# Patient Record
Sex: FEMALE | Race: WHITE | NOT HISPANIC OR LATINO | Employment: FULL TIME | URBAN - METROPOLITAN AREA
[De-identification: names, ages, dates, MRNs, and addresses within clinical notes are randomized per-mention and may not be internally consistent; named-entity substitution may affect disease eponyms.]

---

## 2017-03-17 LAB — HM COLONOSCOPY: NORMAL

## 2017-03-27 ENCOUNTER — TRANSCRIBE ORDERS (OUTPATIENT)
Dept: ADMINISTRATIVE | Facility: HOSPITAL | Age: 47
End: 2017-03-27

## 2017-03-27 DIAGNOSIS — Z12.31 VISIT FOR SCREENING MAMMOGRAM: Primary | ICD-10-CM

## 2017-04-18 ENCOUNTER — HOSPITAL ENCOUNTER (OUTPATIENT)
Dept: MAMMOGRAPHY | Facility: HOSPITAL | Age: 47
Discharge: HOME | End: 2017-04-18
Attending: OBSTETRICS & GYNECOLOGY | Admitting: OBSTETRICS & GYNECOLOGY

## 2017-04-18 DIAGNOSIS — R92.1 BREAST CALCIFICATIONS ON MAMMOGRAM: ICD-10-CM

## 2017-04-18 DIAGNOSIS — R92.1 BREAST CALCIFICATIONS ON MAMMOGRAM: Primary | ICD-10-CM

## 2017-04-18 PROCEDURE — G0204 DX MAMMO INCL CAD BI: HCPCS

## 2017-04-18 PROCEDURE — G0279 TOMOSYNTHESIS, MAMMO: HCPCS

## 2017-04-19 ENCOUNTER — TELEPHONE (OUTPATIENT)
Dept: OBSTETRICS AND GYNECOLOGY | Facility: CLINIC | Age: 47
End: 2017-04-19

## 2017-04-19 NOTE — TELEPHONE ENCOUNTER
----- Message from Martinez Hidalgo MD sent at 4/18/2017  4:16 PM EDT -----  Call patient: Normal mammogram

## 2017-06-26 RX ORDER — IRON POLYSACCHARIDE COMPLEX 150 MG
150 CAPSULE ORAL
COMMUNITY
Start: 2017-04-14 | End: 2017-07-11

## 2017-06-26 RX ORDER — HYDROCHLOROTHIAZIDE 12.5 MG/1
12.5 TABLET ORAL DAILY
COMMUNITY
Start: 2016-12-16 | End: 2021-06-11

## 2017-06-26 RX ORDER — ERGOCALCIFEROL 1.25 MG/1
50000 CAPSULE ORAL
COMMUNITY
End: 2017-07-11

## 2017-06-28 ENCOUNTER — OFFICE VISIT (OUTPATIENT)
Dept: SURGERY | Facility: CLINIC | Age: 47
End: 2017-06-28

## 2017-06-28 VITALS
SYSTOLIC BLOOD PRESSURE: 134 MMHG | BODY MASS INDEX: 25.73 KG/M2 | DIASTOLIC BLOOD PRESSURE: 84 MMHG | HEART RATE: 99 BPM | OXYGEN SATURATION: 98 % | WEIGHT: 136.2 LBS | TEMPERATURE: 98.6 F

## 2017-06-28 DIAGNOSIS — K62.5 RECTAL BLEEDING: ICD-10-CM

## 2017-06-28 DIAGNOSIS — D64.9 ANEMIA, UNSPECIFIED TYPE: ICD-10-CM

## 2017-06-28 DIAGNOSIS — Z87.19 HISTORY OF CROHN'S DISEASE: ICD-10-CM

## 2017-06-28 DIAGNOSIS — K62.1 RECTAL POLYP: Primary | ICD-10-CM

## 2017-06-28 PROCEDURE — 46600 DIAGNOSTIC ANOSCOPY SPX: CPT | Performed by: COLON & RECTAL SURGERY

## 2017-06-28 PROCEDURE — 99244 OFF/OP CNSLTJ NEW/EST MOD 40: CPT | Performed by: COLON & RECTAL SURGERY

## 2017-06-28 RX ORDER — SODIUM CHLORIDE 0.9 % (FLUSH) 0.9 %
1-10 SYRINGE (ML) INJECTION AS NEEDED
Status: CANCELLED | OUTPATIENT
Start: 2017-06-28

## 2017-06-28 RX ORDER — CEFAZOLIN SODIUM 2 G/100ML
2 INJECTION, SOLUTION INTRAVENOUS ONCE
Status: CANCELLED | OUTPATIENT
Start: 2017-06-28 | End: 2017-06-28

## 2017-06-28 RX ORDER — HYDROCORTISONE ACETATE 25 MG
SUPPOSITORY, RECTAL RECTAL
COMMUNITY
Start: 2017-05-30 | End: 2017-07-11

## 2017-06-28 NOTE — PROGRESS NOTES
"Theresa Capone is a 46 y.o. female who is seen as a consult at the request of Dr. Martinez Hidalgo for Rectal Bleeding.      HPI:    Pt with Crohn's diagnosed 1992 c/o rectal bleeding x several months    Was on Humira <1 year [id number]  Not currently on an meds for Crohn's  Dr. Murcia had considered entyvio    History resection TI/cecum 1993    2004: pt states she had a \"leakage in colon cauterized\"    Hx perianal fistulae    Most recent colonoscopy Dr. Murcia 3/17/2017    Bright red blood per rectum has worsened since then    Sees blood on toilet paper daily    Has 2 BMs per day    OB/GYN:  +hx irreg Pap  + ?colposcopy    Past Medical History:   Diagnosis Date   • Anemia     IRON DEFICIENCY   • Anemia     VITAMIN B 12 ANEMIA   • ASCUS with positive high risk HPV cervical 2014   • Crohn's disease 1991   • Crohn's ileitis 1992   • Fibrocystic breast    • Fistula of small intestine 1990   • H/O intestinal malabsorption     D/T CROHNS   • Hair loss    • History of blood transfusion     MULTIPLE   • Hypertension    • Immunosuppression     D/T MEDS FOR CROHNS   • Infertility, female    • LGSIL (low grade squamous intraepithelial dysplasia) 2015   • Perianal fistula     MULTIPLE   • Vitamin B 12 deficiency 07/2016   • Vitamin D deficiency        Past Surgical History:   Procedure Laterality Date   • APPENDECTOMY N/A    • BREAST BIOPSY Right 04/17/2015    BENIGN-DR.HEATH HIDALGO   • BREAST BIOPSY Right 12/14/2010    BENIGN- DR. BERNADETTE MUSE   • COLON RESECTION  1991   • COLONOSCOPY N/A 2010    WNL   • COLONOSCOPY N/A 03/07/2014    CIRCUMFERENTIAL ULCERATION AT TI, FB STAPLE REMOVED AND AREA COAGULATED,    • COLPOSCOPY W/ BIOPSY / CURETTAGE  06/12/2015    [facility]   • D&C HYSTEROSCOPY ENDOMETRIAL ABLATION N/A 11/15/2012    [facility]IA / DR. HIDALGO   • ENDOSCOPY AND COLONOSCOPY N/A 03/17/2017    ACTIVE ULCERATION AT THE NEOTERMINAL ILEUM, HYPERTROPHIC ANAL PAPILLA,    • ENDOSCOPY AND COLONOSCOPY N/A 10/2012    CLS WITH " ILEOCOLONIC ANASTAMOSIS EROSION, EGD OK, NEGATIVE SB BX,    • SMALL INTESTINE SURGERY N/A 1990    PERFORATION       Social History:   reports that she has never smoked. She has never used smokeless tobacco. She reports that she does not drink alcohol or use illicit drugs.      Marriage status:     Family History   Problem Relation Age of Onset   • Hypertension Mother    • Hypertension Father    • Stroke Maternal Aunt    • Hypertension Brother          Current Outpatient Prescriptions:   •  BIOTIN 5000 PO, Take 5,000 mg by mouth., Disp: , Rfl:   •  MAGNESIUM-POTASSIUM PO, Take  by mouth., Disp: , Rfl:   •  ANUCORT-HC 25 MG suppository, , Disp: , Rfl:   •  hydrochlorothiazide (HYDRODIURIL) 12.5 MG tablet, Take 12.5 mg by mouth., Disp: , Rfl:   •  hydrocortisone (ANUSOL-HC) 2.5 % rectal cream, Insert  into the rectum., Disp: , Rfl:   •  iron polysaccharides (NIFEREX) 150 MG capsule, Take 150 mg by mouth., Disp: , Rfl:   •  sodium phosphates (VISICOL) 1.102-0.398 G tablet tablet, Take 32 tablets by mouth., Disp: , Rfl:   •  VITAMIN B1-B12 IM, Inject  into the shoulder, thigh, or buttocks., Disp: , Rfl:   •  vitamin D (ERGOCALCIFEROL) 73881 UNITS capsule capsule, Take 50,000 Units by mouth., Disp: , Rfl:     Allergy  Review of patient's allergies indicates no known allergies.    Review of Systems   Constitution: Negative for decreased appetite, weakness and weight gain.   HENT: Negative for congestion, headaches, hearing loss and hoarse voice.    Eyes: Negative for blurred vision, discharge and visual disturbance.   Cardiovascular: Negative for chest pain, cyanosis and leg swelling.   Respiratory: Negative for cough, shortness of breath, sleep disturbances due to breathing and snoring.    Endocrine: Negative for cold intolerance and heat intolerance.   Hematologic/Lymphatic: Does not bruise/bleed easily.   Skin: Negative for itching, poor wound healing and skin cancer.   Musculoskeletal: Negative for  arthritis, back pain, joint pain and joint swelling.   Gastrointestinal: Negative for abdominal pain, change in bowel habit, bowel incontinence and constipation.   Genitourinary: Negative for bladder incontinence, dysuria and hematuria.   Neurological: Negative for brief paralysis, excessive daytime sleepiness, dizziness, focal weakness and light-headedness.   Psychiatric/Behavioral: Negative for altered mental status and hallucinations. The patient does not have insomnia.    Allergic/Immunologic: Negative for HIV exposure and persistent infections.   All other systems reviewed and are negative.      Vitals:    06/28/17 1545   BP: 134/84   Pulse: 99   Temp: 98.6 °F (37 °C)   SpO2: 98%     Body mass index is 25.73 kg/(m^2).    Physical Exam   Constitutional: She is oriented to person, place, and time. She appears well-developed and well-nourished. No distress.   HENT:   Head: Normocephalic and atraumatic.   Nose: Nose normal.   Mouth/Throat: Oropharynx is clear and moist.   Eyes: Conjunctivae and EOM are normal. Pupils are equal, round, and reactive to light.   Neck: Normal range of motion. No tracheal deviation present.   Pulmonary/Chest: Effort normal and breath sounds normal. No respiratory distress.   Abdominal: Soft. Bowel sounds are normal. She exhibits no distension.   Genitourinary:   Genitourinary Comments: Perianal exam: external hem - wnl  MAMADOU- L anal canal polyp  Anoscopy performed:  L inflamed polypoid lesion 2.5 cm long   Musculoskeletal: Normal range of motion. She exhibits no edema or deformity.   Neurological: She is alert and oriented to person, place, and time. No cranial nerve deficit. Coordination and gait normal.   Skin: Skin is warm and dry.   Psychiatric: She has a normal mood and affect. Her behavior is normal. Judgment and thought content normal.       Review of Medical Record:  I reviewed Dr. Murcia notes, Dr. Frazier notes    Assessment:  1. Rectal polyp    2. Rectal bleeding    3. History  of Crohn's disease    4. Anemia, unspecified type        Plan:    I recommend transanal excision of rectal polyp.  I discussed risk including bleeding, infection, injury to sphincters; benefits; and alternatives. Also discussed increased risk of poor wound healing due to Crohn's.  I discussed in detail expected recovery, possible urinary retention, time off activities, and healing.  Patient expresses understanding and wishes to proceed.    After excision, I discussed with pt and  that pt needs to f/u with Dr. Murcia re: getting on meds for Crohns    Scribed for Agustina Melendrez MD by Rut Johns PA-C 6/28/2017    This patient was evaluated by me, recommendations made, documentation reviewed, edited, and revised by me, Agustina Melendrez MD      EMR Dragon/Transcription disclaimer:   Much of this encounter note is an electronic transcription/translation of spoken language to printed text. The electronic translation of spoken language may permit erroneous, or at times, nonsensical words or phrases to be inadvertently transcribed; Although I have reviewed the note for such errors, some may still exist.

## 2017-07-11 ENCOUNTER — APPOINTMENT (OUTPATIENT)
Dept: PREADMISSION TESTING | Facility: HOSPITAL | Age: 47
End: 2017-07-11

## 2017-07-11 VITALS
SYSTOLIC BLOOD PRESSURE: 114 MMHG | RESPIRATION RATE: 18 BRPM | HEART RATE: 67 BPM | OXYGEN SATURATION: 99 % | HEIGHT: 61 IN | BODY MASS INDEX: 25.85 KG/M2 | WEIGHT: 136.9 LBS | DIASTOLIC BLOOD PRESSURE: 77 MMHG | TEMPERATURE: 97.8 F

## 2017-07-11 LAB
ANION GAP SERPL CALCULATED.3IONS-SCNC: 10.5 MMOL/L
BUN BLD-MCNC: 18 MG/DL (ref 6–20)
BUN/CREAT SERPL: 20.2 (ref 7–25)
CALCIUM SPEC-SCNC: 9.6 MG/DL (ref 8.6–10.5)
CHLORIDE SERPL-SCNC: 98 MMOL/L (ref 98–107)
CO2 SERPL-SCNC: 31.5 MMOL/L (ref 22–29)
CREAT BLD-MCNC: 0.89 MG/DL (ref 0.57–1)
DEPRECATED RDW RBC AUTO: 47.7 FL (ref 37–54)
ERYTHROCYTE [DISTWIDTH] IN BLOOD BY AUTOMATED COUNT: 14.1 % (ref 11.7–13)
GFR SERPL CREATININE-BSD FRML MDRD: 68 ML/MIN/1.73
GLUCOSE BLD-MCNC: 115 MG/DL (ref 65–99)
HCG SERPL QL: NEGATIVE
HCT VFR BLD AUTO: 38.4 % (ref 35.6–45.5)
HGB BLD-MCNC: 13.3 G/DL (ref 11.9–15.5)
MCH RBC QN AUTO: 32.3 PG (ref 26.9–32)
MCHC RBC AUTO-ENTMCNC: 34.6 G/DL (ref 32.4–36.3)
MCV RBC AUTO: 93.2 FL (ref 80.5–98.2)
PLATELET # BLD AUTO: 231 10*3/MM3 (ref 140–500)
PMV BLD AUTO: 11.4 FL (ref 6–12)
POTASSIUM BLD-SCNC: 3.7 MMOL/L (ref 3.5–5.2)
RBC # BLD AUTO: 4.12 10*6/MM3 (ref 3.9–5.2)
SODIUM BLD-SCNC: 140 MMOL/L (ref 136–145)
WBC NRBC COR # BLD: 6.17 10*3/MM3 (ref 4.5–10.7)

## 2017-07-11 PROCEDURE — 36415 COLL VENOUS BLD VENIPUNCTURE: CPT

## 2017-07-11 PROCEDURE — 84703 CHORIONIC GONADOTROPIN ASSAY: CPT | Performed by: COLON & RECTAL SURGERY

## 2017-07-11 PROCEDURE — 80048 BASIC METABOLIC PNL TOTAL CA: CPT | Performed by: COLON & RECTAL SURGERY

## 2017-07-11 PROCEDURE — 85027 COMPLETE CBC AUTOMATED: CPT | Performed by: COLON & RECTAL SURGERY

## 2017-07-11 NOTE — DISCHARGE INSTRUCTIONS
Take the following medications the morning of surgery with a small sip of water:  NONE    Arrive to hospital on your day of surgery at 5:30 AM.    General Instructions:  • Do not eat solid food after midnight the night before surgery.  • You may drink clear liquids day of surgery but must stop at least one hour before your hospital arrival time 4:30 AM.  • It is beneficial for you to have a clear drink that contains carbohydrates the day of surgery.  We suggest a 20 ounce bottle of Gatorade or Powerade for non-diabetic patients or a 20 ounce bottle of G2 or Powerade Zero for diabetic patients. (Pediatric patients, are not advised to drink a 20 ounce carbohydrate drink)    Clear liquids are liquids you can see through.  Nothing red in color.     Plain water                               Sports drinks  Sodas                                   Gelatin (Jell-O)  Fruit juices without pulp such as white grape juice and apple juice  Popsicles that contain no fruit or yogurt  Tea or coffee (no cream or milk added)  Gatorade / Powerade  G2 / Powerade Zero    • Infants may have breast milk up to four hours before surgery.  • Infants drinking formula may drink formula up to six hours before surgery.   • Patients who avoid smoking, chewing tobacco and alcohol for 4 weeks prior to surgery have a reduced risk of post-operative complications.  Quit smoking as many days before surgery as you can.  • Do not smoke, use chewing tobacco or drink alcohol the day of surgery.   • If applicable bring your C-PAP/ BI-PAP machine.  • Bring any papers given to you in the doctor’s office.  • Wear clean comfortable clothes and socks.  • Do not wear contact lenses or make-up.  Bring a case for your glasses.   • Bring crutches or walker if applicable.  • Leave all other valuables and jewelry at home.  • The Pre-Admission Testing nurse will instruct you to bring medications if unable to obtain an accurate list in Pre-Admission Testing.        If you  were given a blood bank ID arm band remember to bring it with you the day of surgery.    Preventing a Surgical Site Infection:  • For 2 to 3 days before surgery, avoid shaving with a razor because the razor can irritate skin and make it easier to develop an infection.  • The night prior to surgery sleep in a clean bed with clean clothing.  Do not allow pets to sleep with you.  • Shower on the morning of surgery using a fresh bar of anti-bacterial soap (such as Dial) and clean washcloth.  Dry with a clean towel and dress in clean clothing.  • Ask your surgeon if you will be receiving antibiotics prior to surgery.  • Make sure you, your family, and all healthcare providers clean their hands with soap and water or an alcohol based hand  before caring for you or your wound.    Day of surgery:  Upon arrival, a Pre-op nurse and Anesthesiologist will review your health history, obtain vital signs, and answer questions you may have.  The only belongings needed at this time will be your home medications and if applicable your C-PAP/BI-PAP machine.  If you are staying overnight your family can leave the rest of your belongings in the car and bring them to your room later.  A Pre-op nurse will start an IV and you may receive medication in preparation for surgery, including something to help you relax.  Your family will be able to see you in the Pre-op area.  While you are in surgery your family should notify the waiting room  if they leave the waiting room area and provide a contact phone number.    Please be aware that surgery does come with discomfort.  We want to make every effort to control your discomfort so please discuss any uncontrolled symptoms with your nurse.   Your doctor will most likely have prescribed pain medications.      If you are going home after surgery you will receive individualized written care instructions before being discharged.  A responsible adult must drive you to and from the  hospital on the day of your surgery and stay with you for 24 hours.    If you are staying overnight following surgery, you will be transported to your hospital room following the recovery period.  [facility] has all private rooms.    If you have any questions please call Pre-Admission Testing at [phone].  Deductibles and co-payments are collected on the day of service. Please be prepared to pay the required co-pay, deductible or deposit on the day of service as defined by your plan.

## 2017-07-17 ENCOUNTER — HOSPITAL ENCOUNTER (OUTPATIENT)
Facility: HOSPITAL | Age: 47
Setting detail: HOSPITAL OUTPATIENT SURGERY
Discharge: HOME | End: 2017-07-17
Attending: COLON & RECTAL SURGERY | Admitting: COLON & RECTAL SURGERY

## 2017-07-17 ENCOUNTER — ANESTHESIA (OUTPATIENT)
Dept: PERIOP | Facility: HOSPITAL | Age: 47
End: 2017-07-17

## 2017-07-17 ENCOUNTER — ANESTHESIA EVENT (OUTPATIENT)
Dept: PERIOP | Facility: HOSPITAL | Age: 47
End: 2017-07-17

## 2017-07-17 VITALS
RESPIRATION RATE: 18 BRPM | DIASTOLIC BLOOD PRESSURE: 75 MMHG | OXYGEN SATURATION: 98 % | HEART RATE: 59 BPM | HEIGHT: 61 IN | WEIGHT: 136 LBS | TEMPERATURE: 97.6 F | BODY MASS INDEX: 25.68 KG/M2 | SYSTOLIC BLOOD PRESSURE: 117 MMHG

## 2017-07-17 DIAGNOSIS — K62.1 RECTAL POLYP: ICD-10-CM

## 2017-07-17 PROCEDURE — 25010000003 CEFAZOLIN IN DEXTROSE 2-4 GM/100ML-% SOLUTION: Performed by: PHYSICIAN ASSISTANT

## 2017-07-17 PROCEDURE — 25010000002 PROPOFOL 10 MG/ML EMULSION: Performed by: NURSE ANESTHETIST, CERTIFIED REGISTERED

## 2017-07-17 PROCEDURE — 45171 EXC RECT TUM TRANSANAL PART: CPT | Performed by: COLON & RECTAL SURGERY

## 2017-07-17 PROCEDURE — 88307 TISSUE EXAM BY PATHOLOGIST: CPT | Performed by: COLON & RECTAL SURGERY

## 2017-07-17 PROCEDURE — 25010000002 MIDAZOLAM PER 1 MG: Performed by: ANESTHESIOLOGY

## 2017-07-17 PROCEDURE — 25010000002 ONDANSETRON PER 1 MG: Performed by: NURSE ANESTHETIST, CERTIFIED REGISTERED

## 2017-07-17 PROCEDURE — 25010000002 NEOSTIGMINE PER 0.5 MG: Performed by: NURSE ANESTHETIST, CERTIFIED REGISTERED

## 2017-07-17 PROCEDURE — 25010000002 DEXAMETHASONE PER 1 MG: Performed by: NURSE ANESTHETIST, CERTIFIED REGISTERED

## 2017-07-17 PROCEDURE — 25010000002 FENTANYL CITRATE (PF) 100 MCG/2ML SOLUTION: Performed by: NURSE ANESTHETIST, CERTIFIED REGISTERED

## 2017-07-17 PROCEDURE — 25010000002 MEPERIDINE 25 MG/0.5ML SOLUTION

## 2017-07-17 RX ORDER — DIPHENHYDRAMINE HYDROCHLORIDE 50 MG/ML
12.5 INJECTION INTRAMUSCULAR; INTRAVENOUS
Status: DISCONTINUED | OUTPATIENT
Start: 2017-07-17 | End: 2017-07-17 | Stop reason: HOSPADM

## 2017-07-17 RX ORDER — LIDOCAINE 50 MG/G
OINTMENT TOPICAL EVERY 4 HOURS PRN
Qty: 1 TUBE | Refills: 5 | Status: SHIPPED | OUTPATIENT
Start: 2017-07-17 | End: 2017-08-16

## 2017-07-17 RX ORDER — MAGNESIUM HYDROXIDE 1200 MG/15ML
LIQUID ORAL AS NEEDED
Status: DISCONTINUED | OUTPATIENT
Start: 2017-07-17 | End: 2017-07-17 | Stop reason: HOSPADM

## 2017-07-17 RX ORDER — GLYCOPYRROLATE 0.2 MG/ML
INJECTION INTRAMUSCULAR; INTRAVENOUS AS NEEDED
Status: DISCONTINUED | OUTPATIENT
Start: 2017-07-17 | End: 2017-07-17 | Stop reason: SURG

## 2017-07-17 RX ORDER — OXYCODONE HYDROCHLORIDE AND ACETAMINOPHEN 5; 325 MG/1; MG/1
2 TABLET ORAL ONCE AS NEEDED
Status: DISCONTINUED | OUTPATIENT
Start: 2017-07-17 | End: 2017-07-17 | Stop reason: HOSPADM

## 2017-07-17 RX ORDER — PROMETHAZINE HYDROCHLORIDE 25 MG/ML
12.5 INJECTION, SOLUTION INTRAMUSCULAR; INTRAVENOUS ONCE AS NEEDED
Status: DISCONTINUED | OUTPATIENT
Start: 2017-07-17 | End: 2017-07-17 | Stop reason: HOSPADM

## 2017-07-17 RX ORDER — MIDAZOLAM HYDROCHLORIDE 1 MG/ML
2 INJECTION INTRAMUSCULAR; INTRAVENOUS
Status: DISCONTINUED | OUTPATIENT
Start: 2017-07-17 | End: 2017-07-17 | Stop reason: HOSPADM

## 2017-07-17 RX ORDER — CETIRIZINE HYDROCHLORIDE 10 MG/1
10 TABLET ORAL DAILY
COMMUNITY
End: 2017-09-13

## 2017-07-17 RX ORDER — PROMETHAZINE HYDROCHLORIDE 12.5 MG/1
TABLET ORAL
Qty: 46 TABLET | Refills: 0 | Status: SHIPPED | OUTPATIENT
Start: 2017-07-17 | End: 2017-09-13

## 2017-07-17 RX ORDER — ONDANSETRON 4 MG/1
4 TABLET, FILM COATED ORAL ONCE AS NEEDED
Status: DISCONTINUED | OUTPATIENT
Start: 2017-07-17 | End: 2017-07-17 | Stop reason: HOSPADM

## 2017-07-17 RX ORDER — MIDAZOLAM HYDROCHLORIDE 1 MG/ML
1 INJECTION INTRAMUSCULAR; INTRAVENOUS
Status: DISCONTINUED | OUTPATIENT
Start: 2017-07-17 | End: 2017-07-17 | Stop reason: HOSPADM

## 2017-07-17 RX ORDER — NALOXONE HCL 0.4 MG/ML
0.2 VIAL (ML) INJECTION AS NEEDED
Status: DISCONTINUED | OUTPATIENT
Start: 2017-07-17 | End: 2017-07-17 | Stop reason: HOSPADM

## 2017-07-17 RX ORDER — ONDANSETRON 2 MG/ML
INJECTION INTRAMUSCULAR; INTRAVENOUS AS NEEDED
Status: DISCONTINUED | OUTPATIENT
Start: 2017-07-17 | End: 2017-07-17 | Stop reason: SURG

## 2017-07-17 RX ORDER — PROMETHAZINE HYDROCHLORIDE 25 MG/1
25 TABLET ORAL ONCE AS NEEDED
Status: DISCONTINUED | OUTPATIENT
Start: 2017-07-17 | End: 2017-07-17 | Stop reason: HOSPADM

## 2017-07-17 RX ORDER — FENTANYL CITRATE 50 UG/ML
50 INJECTION, SOLUTION INTRAMUSCULAR; INTRAVENOUS
Status: DISCONTINUED | OUTPATIENT
Start: 2017-07-17 | End: 2017-07-17 | Stop reason: HOSPADM

## 2017-07-17 RX ORDER — OXYCODONE HYDROCHLORIDE AND ACETAMINOPHEN 5; 325 MG/1; MG/1
TABLET ORAL
Qty: 64 TABLET | Refills: 0 | Status: SHIPPED | OUTPATIENT
Start: 2017-07-17 | End: 2017-07-25

## 2017-07-17 RX ORDER — EPHEDRINE SULFATE 50 MG/ML
5 INJECTION, SOLUTION INTRAVENOUS ONCE AS NEEDED
Status: DISCONTINUED | OUTPATIENT
Start: 2017-07-17 | End: 2017-07-17 | Stop reason: HOSPADM

## 2017-07-17 RX ORDER — LIDOCAINE HYDROCHLORIDE 40 MG/ML
SOLUTION TOPICAL AS NEEDED
Status: DISCONTINUED | OUTPATIENT
Start: 2017-07-17 | End: 2017-07-17 | Stop reason: SURG

## 2017-07-17 RX ORDER — LABETALOL HYDROCHLORIDE 5 MG/ML
5 INJECTION, SOLUTION INTRAVENOUS
Status: DISCONTINUED | OUTPATIENT
Start: 2017-07-17 | End: 2017-07-17 | Stop reason: HOSPADM

## 2017-07-17 RX ORDER — HYDRALAZINE HYDROCHLORIDE 20 MG/ML
5 INJECTION INTRAMUSCULAR; INTRAVENOUS
Status: DISCONTINUED | OUTPATIENT
Start: 2017-07-17 | End: 2017-07-17 | Stop reason: HOSPADM

## 2017-07-17 RX ORDER — PROPOFOL 10 MG/ML
VIAL (ML) INTRAVENOUS AS NEEDED
Status: DISCONTINUED | OUTPATIENT
Start: 2017-07-17 | End: 2017-07-17 | Stop reason: SURG

## 2017-07-17 RX ORDER — PROMETHAZINE HYDROCHLORIDE 25 MG/1
25 SUPPOSITORY RECTAL ONCE AS NEEDED
Status: DISCONTINUED | OUTPATIENT
Start: 2017-07-17 | End: 2017-07-17 | Stop reason: HOSPADM

## 2017-07-17 RX ORDER — HYDROCODONE BITARTRATE AND ACETAMINOPHEN 7.5; 325 MG/1; MG/1
1 TABLET ORAL ONCE AS NEEDED
Status: DISCONTINUED | OUTPATIENT
Start: 2017-07-17 | End: 2017-07-17 | Stop reason: HOSPADM

## 2017-07-17 RX ORDER — SODIUM CHLORIDE, SODIUM LACTATE, POTASSIUM CHLORIDE, CALCIUM CHLORIDE 600; 310; 30; 20 MG/100ML; MG/100ML; MG/100ML; MG/100ML
9 INJECTION, SOLUTION INTRAVENOUS CONTINUOUS
Status: DISCONTINUED | OUTPATIENT
Start: 2017-07-17 | End: 2017-07-17 | Stop reason: HOSPADM

## 2017-07-17 RX ORDER — PROMETHAZINE HYDROCHLORIDE 25 MG/1
12.5 TABLET ORAL ONCE AS NEEDED
Status: DISCONTINUED | OUTPATIENT
Start: 2017-07-17 | End: 2017-07-17 | Stop reason: HOSPADM

## 2017-07-17 RX ORDER — HYDROMORPHONE HYDROCHLORIDE 1 MG/ML
0.5 INJECTION, SOLUTION INTRAMUSCULAR; INTRAVENOUS; SUBCUTANEOUS
Status: DISCONTINUED | OUTPATIENT
Start: 2017-07-17 | End: 2017-07-17 | Stop reason: HOSPADM

## 2017-07-17 RX ORDER — ONDANSETRON 2 MG/ML
4 INJECTION INTRAMUSCULAR; INTRAVENOUS ONCE AS NEEDED
Status: DISCONTINUED | OUTPATIENT
Start: 2017-07-17 | End: 2017-07-17 | Stop reason: HOSPADM

## 2017-07-17 RX ORDER — FLUMAZENIL 0.1 MG/ML
0.2 INJECTION INTRAVENOUS AS NEEDED
Status: DISCONTINUED | OUTPATIENT
Start: 2017-07-17 | End: 2017-07-17 | Stop reason: HOSPADM

## 2017-07-17 RX ORDER — LIDOCAINE HYDROCHLORIDE 20 MG/ML
INJECTION, SOLUTION INFILTRATION; PERINEURAL AS NEEDED
Status: DISCONTINUED | OUTPATIENT
Start: 2017-07-17 | End: 2017-07-17 | Stop reason: SURG

## 2017-07-17 RX ORDER — FAMOTIDINE 10 MG/ML
20 INJECTION, SOLUTION INTRAVENOUS ONCE
Status: COMPLETED | OUTPATIENT
Start: 2017-07-17 | End: 2017-07-17

## 2017-07-17 RX ORDER — SODIUM CHLORIDE 0.9 % (FLUSH) 0.9 %
1-10 SYRINGE (ML) INJECTION AS NEEDED
Status: DISCONTINUED | OUTPATIENT
Start: 2017-07-17 | End: 2017-07-17 | Stop reason: HOSPADM

## 2017-07-17 RX ORDER — FENTANYL CITRATE 50 UG/ML
INJECTION, SOLUTION INTRAMUSCULAR; INTRAVENOUS AS NEEDED
Status: DISCONTINUED | OUTPATIENT
Start: 2017-07-17 | End: 2017-07-17 | Stop reason: SURG

## 2017-07-17 RX ORDER — POLYETHYLENE GLYCOL 3350 17 G/17G
17 POWDER, FOR SOLUTION ORAL DAILY
Start: 2017-07-17 | End: 2017-09-13

## 2017-07-17 RX ORDER — ROCURONIUM BROMIDE 10 MG/ML
INJECTION, SOLUTION INTRAVENOUS AS NEEDED
Status: DISCONTINUED | OUTPATIENT
Start: 2017-07-17 | End: 2017-07-17 | Stop reason: SURG

## 2017-07-17 RX ORDER — CEFAZOLIN SODIUM 2 G/100ML
2 INJECTION, SOLUTION INTRAVENOUS ONCE
Status: COMPLETED | OUTPATIENT
Start: 2017-07-17 | End: 2017-07-17

## 2017-07-17 RX ORDER — DEXAMETHASONE SODIUM PHOSPHATE 10 MG/ML
INJECTION INTRAMUSCULAR; INTRAVENOUS AS NEEDED
Status: DISCONTINUED | OUTPATIENT
Start: 2017-07-17 | End: 2017-07-17 | Stop reason: SURG

## 2017-07-17 RX ORDER — OXYCODONE AND ACETAMINOPHEN 7.5; 325 MG/1; MG/1
1 TABLET ORAL ONCE AS NEEDED
Status: DISCONTINUED | OUTPATIENT
Start: 2017-07-17 | End: 2017-07-17 | Stop reason: HOSPADM

## 2017-07-17 RX ADMIN — DEXAMETHASONE SODIUM PHOSPHATE 8 MG: 10 INJECTION INTRAMUSCULAR; INTRAVENOUS at 07:44

## 2017-07-17 RX ADMIN — SODIUM CHLORIDE, POTASSIUM CHLORIDE, SODIUM LACTATE AND CALCIUM CHLORIDE: 600; 310; 30; 20 INJECTION, SOLUTION INTRAVENOUS at 08:13

## 2017-07-17 RX ADMIN — LIDOCAINE HYDROCHLORIDE 1 EACH: 40 SPRAY LARYNGEAL; TRANSTRACHEAL at 07:42

## 2017-07-17 RX ADMIN — LIDOCAINE HYDROCHLORIDE 60 MG: 20 INJECTION, SOLUTION INFILTRATION; PERINEURAL at 07:35

## 2017-07-17 RX ADMIN — PROPOFOL 150 MG: 10 INJECTION, EMULSION INTRAVENOUS at 07:35

## 2017-07-17 RX ADMIN — ROCURONIUM BROMIDE 25 MG: 10 INJECTION INTRAVENOUS at 07:35

## 2017-07-17 RX ADMIN — NEOSTIGMINE METHYLSULFATE 3 MG: 1 INJECTION INTRAMUSCULAR; INTRAVENOUS; SUBCUTANEOUS at 08:07

## 2017-07-17 RX ADMIN — CEFAZOLIN SODIUM 2 G: 2 INJECTION, SOLUTION INTRAVENOUS at 07:43

## 2017-07-17 RX ADMIN — GLYCOPYRROLATE 0.4 MG: 0.2 INJECTION INTRAMUSCULAR; INTRAVENOUS at 08:07

## 2017-07-17 RX ADMIN — SODIUM CHLORIDE, POTASSIUM CHLORIDE, SODIUM LACTATE AND CALCIUM CHLORIDE 9 ML/HR: 600; 310; 30; 20 INJECTION, SOLUTION INTRAVENOUS at 07:04

## 2017-07-17 RX ADMIN — ONDANSETRON 4 MG: 2 INJECTION INTRAMUSCULAR; INTRAVENOUS at 08:05

## 2017-07-17 RX ADMIN — FENTANYL CITRATE 50 MCG: 50 INJECTION INTRAMUSCULAR; INTRAVENOUS at 07:31

## 2017-07-17 RX ADMIN — METRONIDAZOLE 500 MG: 500 INJECTION, SOLUTION INTRAVENOUS at 06:43

## 2017-07-17 RX ADMIN — MIDAZOLAM 2 MG: 1 INJECTION INTRAMUSCULAR; INTRAVENOUS at 07:04

## 2017-07-17 RX ADMIN — FAMOTIDINE 20 MG: 10 INJECTION INTRAVENOUS at 07:04

## 2017-07-17 RX ADMIN — MEPERIDINE HYDROCHLORIDE 12.5 MG: 25 INJECTION, SOLUTION INTRAMUSCULAR; INTRAVENOUS; SUBCUTANEOUS at 08:29

## 2017-07-17 NOTE — H&P (VIEW-ONLY)
"Theresa Capone is a 46 y.o. female who is seen as a consult at the request of Dr. Martinez Hidalgo for Rectal Bleeding.      HPI:    Pt with Crohn's diagnosed 1992 c/o rectal bleeding x several months    Was on Humira <1 year [id number]  Not currently on an meds for Crohn's  Dr. Murcia had considered entyvio    History resection TI/cecum 1993    2004: pt states she had a \"leakage in colon cauterized\"    Hx perianal fistulae    Most recent colonoscopy Dr. Murcia 3/17/2017    Bright red blood per rectum has worsened since then    Sees blood on toilet paper daily    Has 2 BMs per day    OB/GYN:  +hx irreg Pap  + ?colposcopy    Past Medical History:   Diagnosis Date   • Anemia     IRON DEFICIENCY   • Anemia     VITAMIN B 12 ANEMIA   • ASCUS with positive high risk HPV cervical 2014   • Crohn's disease 1991   • Crohn's ileitis 1992   • Fibrocystic breast    • Fistula of small intestine 1990   • H/O intestinal malabsorption     D/T CROHNS   • Hair loss    • History of blood transfusion     MULTIPLE   • Hypertension    • Immunosuppression     D/T MEDS FOR CROHNS   • Infertility, female    • LGSIL (low grade squamous intraepithelial dysplasia) 2015   • Perianal fistula     MULTIPLE   • Vitamin B 12 deficiency 07/2016   • Vitamin D deficiency        Past Surgical History:   Procedure Laterality Date   • APPENDECTOMY N/A    • BREAST BIOPSY Right 04/17/2015    BENIGN-DR.HEATH HIDALGO   • BREAST BIOPSY Right 12/14/2010    BENIGN- DR. BERNADETTE MUSE   • COLON RESECTION  1991   • COLONOSCOPY N/A 2010    WNL   • COLONOSCOPY N/A 03/07/2014    CIRCUMFERENTIAL ULCERATION AT TI, FB STAPLE REMOVED AND AREA COAGULATED,    • COLPOSCOPY W/ BIOPSY / CURETTAGE  06/12/2015    [facility]   • D&C HYSTEROSCOPY ENDOMETRIAL ABLATION N/A 11/15/2012    YECENIA / DR. HIDALGO   • ENDOSCOPY AND COLONOSCOPY N/A 03/17/2017    ACTIVE ULCERATION AT THE NEOTERMINAL ILEUM, HYPERTROPHIC ANAL PAPILLA,    • ENDOSCOPY AND COLONOSCOPY N/A 10/2012    CLS WITH " ILEOCOLONIC ANASTAMOSIS EROSION, EGD OK, NEGATIVE SB BX,    • SMALL INTESTINE SURGERY N/A 1990    PERFORATION       Social History:   reports that she has never smoked. She has never used smokeless tobacco. She reports that she does not drink alcohol or use illicit drugs.      Marriage status:     Family History   Problem Relation Age of Onset   • Hypertension Mother    • Hypertension Father    • Stroke Maternal Aunt    • Hypertension Brother          Current Outpatient Prescriptions:   •  BIOTIN 5000 PO, Take 5,000 mg by mouth., Disp: , Rfl:   •  MAGNESIUM-POTASSIUM PO, Take  by mouth., Disp: , Rfl:   •  ANUCORT-HC 25 MG suppository, , Disp: , Rfl:   •  hydrochlorothiazide (HYDRODIURIL) 12.5 MG tablet, Take 12.5 mg by mouth., Disp: , Rfl:   •  hydrocortisone (ANUSOL-HC) 2.5 % rectal cream, Insert  into the rectum., Disp: , Rfl:   •  iron polysaccharides (NIFEREX) 150 MG capsule, Take 150 mg by mouth., Disp: , Rfl:   •  sodium phosphates (VISICOL) 1.102-0.398 G tablet tablet, Take 32 tablets by mouth., Disp: , Rfl:   •  VITAMIN B1-B12 IM, Inject  into the shoulder, thigh, or buttocks., Disp: , Rfl:   •  vitamin D (ERGOCALCIFEROL) 74460 UNITS capsule capsule, Take 50,000 Units by mouth., Disp: , Rfl:     Allergy  Review of patient's allergies indicates no known allergies.    Review of Systems   Constitution: Negative for decreased appetite, weakness and weight gain.   HENT: Negative for congestion, headaches, hearing loss and hoarse voice.    Eyes: Negative for blurred vision, discharge and visual disturbance.   Cardiovascular: Negative for chest pain, cyanosis and leg swelling.   Respiratory: Negative for cough, shortness of breath, sleep disturbances due to breathing and snoring.    Endocrine: Negative for cold intolerance and heat intolerance.   Hematologic/Lymphatic: Does not bruise/bleed easily.   Skin: Negative for itching, poor wound healing and skin cancer.   Musculoskeletal: Negative for  arthritis, back pain, joint pain and joint swelling.   Gastrointestinal: Negative for abdominal pain, change in bowel habit, bowel incontinence and constipation.   Genitourinary: Negative for bladder incontinence, dysuria and hematuria.   Neurological: Negative for brief paralysis, excessive daytime sleepiness, dizziness, focal weakness and light-headedness.   Psychiatric/Behavioral: Negative for altered mental status and hallucinations. The patient does not have insomnia.    Allergic/Immunologic: Negative for HIV exposure and persistent infections.   All other systems reviewed and are negative.      Vitals:    06/28/17 1545   BP: 134/84   Pulse: 99   Temp: 98.6 °F (37 °C)   SpO2: 98%     Body mass index is 25.73 kg/(m^2).    Physical Exam   Constitutional: She is oriented to person, place, and time. She appears well-developed and well-nourished. No distress.   HENT:   Head: Normocephalic and atraumatic.   Nose: Nose normal.   Mouth/Throat: Oropharynx is clear and moist.   Eyes: Conjunctivae and EOM are normal. Pupils are equal, round, and reactive to light.   Neck: Normal range of motion. No tracheal deviation present.   Pulmonary/Chest: Effort normal and breath sounds normal. No respiratory distress.   Abdominal: Soft. Bowel sounds are normal. She exhibits no distension.   Genitourinary:   Genitourinary Comments: Perianal exam: external hem - wnl  MAMADOU- L anal canal polyp  Anoscopy performed:  L inflamed polypoid lesion 2.5 cm long   Musculoskeletal: Normal range of motion. She exhibits no edema or deformity.   Neurological: She is alert and oriented to person, place, and time. No cranial nerve deficit. Coordination and gait normal.   Skin: Skin is warm and dry.   Psychiatric: She has a normal mood and affect. Her behavior is normal. Judgment and thought content normal.       Review of Medical Record:  I reviewed Dr. Murcia notes, Dr. Frazier notes    Assessment:  1. Rectal polyp    2. Rectal bleeding    3. History  of Crohn's disease    4. Anemia, unspecified type        Plan:    I recommend transanal excision of rectal polyp.  I discussed risk including bleeding, infection, injury to sphincters; benefits; and alternatives. Also discussed increased risk of poor wound healing due to Crohn's.  I discussed in detail expected recovery, possible urinary retention, time off activities, and healing.  Patient expresses understanding and wishes to proceed.    After excision, I discussed with pt and  that pt needs to f/u with Dr. Murcia re: getting on meds for Crohns    Scribed for Agustina Melendrez MD by Rut Johns PA-C 6/28/2017    This patient was evaluated by me, recommendations made, documentation reviewed, edited, and revised by me, Agustina Melendrez MD      EMR Dragon/Transcription disclaimer:   Much of this encounter note is an electronic transcription/translation of spoken language to printed text. The electronic translation of spoken language may permit erroneous, or at times, nonsensical words or phrases to be inadvertently transcribed; Although I have reviewed the note for such errors, some may still exist.

## 2017-07-17 NOTE — PLAN OF CARE
Problem: Patient Care Overview (Adult)  Goal: Plan of Care Review  Outcome: Ongoing (interventions implemented as appropriate)    07/17/17 0627   Coping/Psychosocial Response Interventions   Plan Of Care Reviewed With patient   Patient Care Overview   Progress no change       Goal: Adult Individualization and Mutuality  Outcome: Ongoing (interventions implemented as appropriate)    07/17/17 0627   Individualization   Patient Specific Preferences goes by Theresa

## 2017-07-17 NOTE — PLAN OF CARE
Problem: Patient Care Overview (Adult)  Goal: Adult Individualization and Mutuality  Outcome: Outcome(s) achieved Date Met:  07/17/17

## 2017-07-17 NOTE — PLAN OF CARE
Problem: Patient Care Overview (Adult)  Goal: Discharge Needs Assessment  Outcome: Outcome(s) achieved Date Met:  07/17/17 07/17/17 1043   Discharge Needs Assessment   Concerns To Be Addressed no discharge needs identified

## 2017-07-17 NOTE — ANESTHESIA PREPROCEDURE EVALUATION
Anesthesia Evaluation     Patient summary reviewed and Nursing notes reviewed   NPO Solid Status: > 8 hours  NPO Liquid Status: > 8 hours     Airway   Mallampati: I  Dental      Pulmonary - negative pulmonary ROS and normal exam    breath sounds clear to auscultation  Cardiovascular - negative cardio ROS and normal exam        Neuro/Psych- negative ROS  GI/Hepatic/Renal/Endo - negative ROS     Musculoskeletal (-) negative ROS    Abdominal    Substance History - negative use     OB/GYN          Other - negative ROS                                       Anesthesia Plan    ASA 1     general     intravenous induction   Anesthetic plan and risks discussed with patient.

## 2017-07-17 NOTE — ANESTHESIA PROCEDURE NOTES
Airway  Urgency: elective    Date/Time: 7/17/2017 7:42 AM  Airway not difficult    General Information and Staff    Patient location during procedure: OR  Anesthesiologist: MAKSIM OLIVARES  CRNA: DAMIEN JACOBSON    Indications and Patient Condition  Indications for airway management: airway protection    Preoxygenated: yes  Mask difficulty assessment: 2 - vent by mask + OA or adjuvant +/- NMBA    Final Airway Details  Final airway type: endotracheal airway      Successful airway: ETT and reinforced tube  Cuffed: yes   Successful intubation technique: direct laryngoscopy  Facilitating devices/methods: intubating stylet and cricoid pressure  Endotracheal tube insertion site: oral  Blade: Saranya  Blade size: #3  ETT size: 7.0 mm  Cormack-Lehane Classification: grade IIb - view of arytenoids or posterior of glottis only  Placement verified by: chest auscultation and capnometry   Measured from: teeth  ETT to teeth (cm): 20  Number of attempts at approach: 2    Additional Comments  Atraumatic. No dental damage noted.

## 2017-07-17 NOTE — PLAN OF CARE
Problem: Perioperative Period (Adult)  Goal: Signs and Symptoms of Listed Potential Problems Will be Absent or Manageable (Perioperative Period)  Outcome: Ongoing (interventions implemented as appropriate)    07/17/17 0622   Perioperative Period   Problems Assessed (Perioperative Period) pain   Problems Present (Perioperative Period) none

## 2017-07-17 NOTE — PLAN OF CARE
Problem: Perioperative Period (Adult)  Goal: Signs and Symptoms of Listed Potential Problems Will be Absent or Manageable (Perioperative Period)  Outcome: Outcome(s) achieved Date Met:  07/17/17

## 2017-07-17 NOTE — DISCHARGE INSTRUCTIONS
Dr. Agustina Melendrez  [address]  [place] [id number] [phone]      Discharge Instructions       1. Go home, rest and take it easy today; however, you should get up and move about several times today to reduce the risk of developing a clot in your legs.      2. You may experience some dizziness or memory loss from the anesthesia.  This may last for the next 24 hours.  Someone should plan on staying with you for the first 24 hours for your safety.    3. Do not make any important legal decisions or sign any legal papers for the next 24 hours.      4. Eat and drink lightly today.  Start off with liquids, jello, soup, crackers or other bland foods at first. Please drink plenty of fluids. You may advance your diet tomorrow as tolerated as long as you do not experience any nausea or vomiting.     5. Some patients will have packing in their rectum.  It should come out will your first bowel movement.  You may remove it sooner yourself if it bothers you.  If it comes out on its own before your first bowel movement, do not worry about it.  It does not need to be replaced.      6. Begin your sitz baths tomorrow.    7. The best method of pain relief is a sitz bath (sitting in a tub or warm water) at least 3 times daily for 10 minutes.  This helps to reduce pain and aids with hygiene/drainage.  The drainage may have an unpleasant odor.  This is not unexpected and should be controlled with baths and showers.  If the skin around the anal area becomes irritated, you may apply Vaseline, A&D ointment or a similar barrier cream to the area.       8. Bleeding and drainage are to be expected and may persist for as long as 2-4 weeks.  Bleeding may occur with your bowel movements as well.  Wear a cotton liner such as a Kotex pad or a panty liner inside your underwear to protect your clothing.       9. You have received a prescription for a narcotic pain medicine, as you will have pain following surgery.   You will not  be totally pain free, but your pain medicine should make the pain tolerable.  Please take your pain medicine as prescribed and always take your pills with food to prevent nausea. Your pain may persist for 1-3 weeks. If you are having severe pain that cannot be controlled by the pain medicine, please contact me.  Typically, patients with anal fissures will have less pain than those with hemorrhoids.      10. The goal is for your bowel movements to be soft which will help to minimize pain.  The pain medicine used to keep your comfortable may also cause some constipation so I recommend the following:    • Miralax (17 grams)--1 capfull every day starting the day after surgery.    • Keep taking fiber everyday (Citrucel, Metamucil, or Fiber-con) as directed.    11. If you are unable to have a bowel movement by 2 days after surgery, try Milk of Magnesia, Magnesium Citrate, or Colace.  If still unable to have a bowel movement, call the office at [phone]      12. No driving for 24 hours and for as long as you are taking your prescription pain medicine.  You may resume your activities gradually.       13. You will need to call the office at [phone] to schedule a follow-up appointment in 10-21 days.    14. Remember to contact me for any of the following:    • Fever > 101 degrees  • Severe pain that cannot be controlled by taking your pain pills  • Severe nausea or vomiting   • Significant bleeding > ½ cup  • Any other questions or concerns

## 2017-07-17 NOTE — PLAN OF CARE
Problem: Patient Care Overview (Adult)  Goal: Plan of Care Review    07/17/17 1015   Coping/Psychosocial Response Interventions   Plan Of Care Reviewed With patient;caregiver   Patient Care Overview   Progress progress toward functional goals is gradual   Outcome Evaluation   Outcome Summary/Follow up Plan RDY FOR DISCHARGE

## 2017-07-17 NOTE — OP NOTE
07/17/17    Surgeon: Agustina Melendrez MD    Preoperative diagnosis: Rectal polyp    Postop diagnosis:Rectal polyp    Procedure: partial thickness transanal MASS EXCISION, * Panel 2 does not exist *    Specimen:.  l lat polyp - 2 long distal, 2 short posterior    Indication:  Theresa Capone is a 46 y.o. female who comes in with Rectal polyp  Patient understands risks, benefits,and alternatives wishes to proceed.      Procedure Details:    Patient was brought into the operating room.  SCDs in place.  Antibiotics had been infused.  After adequate general anesthesia was achieved, patient was placed in prone position. 1% lidocaine with epinephrine and 0.25% Marcaine plain was used as a local infiltration and a perineum block. I did a circumferential exam of the anal canal using a lighted hill potter anoscope and the perianal skin.  I found on the left lateral side a 1.5 cm polyp at the dentate line.  Using electrocautery, I marked out half centimeter margin around the polyp.  Using electrocautery to start the dissection, I was able to get into the submucosal plane.  I then used the Enseal to excise the polyp.  I used 3-0 Vicryl to close the mucosal incision.  I marked the polyp 2 long as the distal margin 2 short as the posterior margin.  There was good hemostasis.    All instrument, lap counts, and needle counts were correct.  Patient was taken to the recovery room in stable condition.

## 2017-07-17 NOTE — PLAN OF CARE
Problem: Patient Care Overview (Adult)  Goal: Plan of Care Review  Outcome: Outcome(s) achieved Date Met:  07/17/17

## 2017-07-18 LAB
CYTO UR: NORMAL
LAB AP CASE REPORT: NORMAL
LAB AP CLINICAL INFORMATION: NORMAL
Lab: NORMAL
PATH REPORT.FINAL DX SPEC: NORMAL
PATH REPORT.GROSS SPEC: NORMAL

## 2017-07-25 ENCOUNTER — OFFICE VISIT (OUTPATIENT)
Dept: SURGERY | Facility: CLINIC | Age: 47
End: 2017-07-25

## 2017-07-25 VITALS
BODY MASS INDEX: 25.64 KG/M2 | RESPIRATION RATE: 20 BRPM | WEIGHT: 135.8 LBS | HEIGHT: 61 IN | HEART RATE: 62 BPM | SYSTOLIC BLOOD PRESSURE: 118 MMHG | OXYGEN SATURATION: 98 % | TEMPERATURE: 97.9 F | DIASTOLIC BLOOD PRESSURE: 74 MMHG

## 2017-07-25 DIAGNOSIS — Z87.19 HISTORY OF CROHN'S DISEASE: ICD-10-CM

## 2017-07-25 DIAGNOSIS — K62.1 RECTAL POLYP: Primary | ICD-10-CM

## 2017-07-25 PROCEDURE — 99024 POSTOP FOLLOW-UP VISIT: CPT | Performed by: COLON & RECTAL SURGERY

## 2017-07-25 NOTE — PROGRESS NOTES
"Theresa Capone is a 46 y.o. female in for follow up of Rectal polyp    History of Crohn's disease  s/p transanal excision rectal polyp 7/172017    No bleeding    Pain has not been bad, just mild discomfort after using bathroom  Only took 2 pain pills    Taking daily miralax    Has appt with Dr. Murcia Monday    /74 (BP Location: Left arm, Patient Position: Sitting, Cuff Size: Adult)  Pulse 62  Temp 97.9 °F (36.6 °C)  Resp 20  Ht 61\" (154.9 cm)  Wt 135 lb 12.8 oz (61.6 kg)  LMP  (LMP Unknown)  SpO2 98%  BMI 25.66 kg/m2  Body mass index is 25.66 kg/(m^2).      PE:  Physical Exam   Constitutional: She appears well-developed. No distress.   HENT:   Head: Normocephalic and atraumatic.   Abdominal: Soft. She exhibits no distension.   Genitourinary:   Genitourinary Comments: Perianal exam: external: left lateral incision healing well.  No blood, no purulence. No edema or erythema   Musculoskeletal: Normal range of motion.   Neurological: She is alert.   Psychiatric: Thought content normal.         Assessment:   1. Rectal polyp    2. History of Crohn's disease     s/p transanal excision rectal polyp 7/172017    Plan:    -doing well post-op  -discussed pathology results: benign inflammatory polyp  -continue Miralax prn stool consistency  -keep appt with Dr. Murcia  -call or come in if any questions or concerns    RTC prn      Scribed for Agustina Melendrez MD by Rut Johns PA-C 7/25/2017    This patient was evaluated by me, recommendations made, documentation reviewed, edited, and revised by me, Agustina Melendrez MD          EMR Dragon/Transcription disclaimer:   Much of this encounter note is an electronic transcription/translation of spoken language to printed text. The electronic translation of spoken language may permit erroneous, or at times, nonsensical words or phrases to be inadvertently transcribed; Although I have reviewed the note for such errors, some may still exist.     "

## 2017-09-13 ENCOUNTER — OFFICE VISIT (OUTPATIENT)
Dept: OBSTETRICS AND GYNECOLOGY | Facility: CLINIC | Age: 47
End: 2017-09-13

## 2017-09-13 VITALS
WEIGHT: 137 LBS | HEIGHT: 61 IN | DIASTOLIC BLOOD PRESSURE: 60 MMHG | SYSTOLIC BLOOD PRESSURE: 120 MMHG | BODY MASS INDEX: 25.86 KG/M2

## 2017-09-13 DIAGNOSIS — R87.612 LGSIL ON PAP SMEAR OF CERVIX: ICD-10-CM

## 2017-09-13 DIAGNOSIS — Z01.419 WELL FEMALE EXAM WITH ROUTINE GYNECOLOGICAL EXAM: Primary | ICD-10-CM

## 2017-09-13 DIAGNOSIS — Z12.4 PAP SMEAR FOR CERVICAL CANCER SCREENING: ICD-10-CM

## 2017-09-13 DIAGNOSIS — Z13.9 SCREENING: ICD-10-CM

## 2017-09-13 DIAGNOSIS — D84.9 IMMUNOSUPPRESSION (HCC): ICD-10-CM

## 2017-09-13 DIAGNOSIS — K50.00 CROHN'S DISEASE OF SMALL INTESTINE WITHOUT COMPLICATION (HCC): ICD-10-CM

## 2017-09-13 DIAGNOSIS — I10 ESSENTIAL HYPERTENSION: ICD-10-CM

## 2017-09-13 LAB
BILIRUB BLD-MCNC: NEGATIVE MG/DL
CLARITY, POC: CLEAR
COLOR UR: YELLOW
GLUCOSE UR STRIP-MCNC: NEGATIVE MG/DL
KETONES UR QL: NEGATIVE
LEUKOCYTE EST, POC: NEGATIVE
NITRITE UR-MCNC: NEGATIVE MG/ML
PH UR: 5.5 [PH] (ref 5–8)
PROT UR STRIP-MCNC: NEGATIVE MG/DL
RBC # UR STRIP: ABNORMAL /UL
SP GR UR: 1 (ref 1–1.03)
UROBILINOGEN UR QL: NORMAL

## 2017-09-13 PROCEDURE — 81002 URINALYSIS NONAUTO W/O SCOPE: CPT | Performed by: OBSTETRICS & GYNECOLOGY

## 2017-09-13 PROCEDURE — 99396 PREV VISIT EST AGE 40-64: CPT | Performed by: OBSTETRICS & GYNECOLOGY

## 2017-09-13 NOTE — PROGRESS NOTES
[facility]  Routine Annual Visit    2017    Patient: Theresa Capone          MR#:[id number]      History of Present Illness    46 y.o. female  who presents for annual exam.    Patient presents feeling well without complaints.  The patient recently discovered a rectal mass and had it removed by Dr. Melendrez and has recovered well.  The mass returned benign findings    No LMP recorded. Patient has had an ablation.  Obstetric History:  OB History      Para Term  AB TAB SAB Ectopic Multiple Living    0 0 0 0 0 0 0 0 0 0         Menstrual History:     No LMP recorded. Patient has had an ablation.       Sexual History:       ________________________________________  Patient Active Problem List   Diagnosis   • Well female exam with routine gynecological exam   • Crohn's disease   • Chronic anemia   • LGSIL on Pap smear of cervix   • Essential hypertension   • Vitamin D deficiency   • Iron deficiency anemia   • Immunosuppression   • Epigastric pain   • Crohn's disease of ileum   • Vitamin B12 deficiency anemia       Past Medical History:   Diagnosis Date   • ASCUS with positive high risk HPV cervical    • Crohn's disease    • Crohn's disease    • Crohn's ileitis    • Fibrocystic breast    • Fistula of small intestine    • Fluid retention     TAKES DIURETIC   • H/O intestinal malabsorption     D/T CROHNS   • Hair loss    • History of blood transfusion     MULTIPLE   • Hypertension     HX, STATES OFF MED FOR 3 YEARS   • Infertility, female    • Iron deficiency anemia     FOLLOWED BY DR LISSY WANG, GETS IRON TRANSFUSIONS   • LGSIL (low grade squamous intraepithelial dysplasia)    • Perianal fistula     MULTIPLE   • Rectal polyp    • Vitamin B 12 deficiency 2016   • Vitamin D deficiency        Past Surgical History:   Procedure Laterality Date   • APPENDECTOMY N/A    • BREAST BIOPSY Right 2015    BENIGN-DR.HEATH MAS   • BREAST BIOPSY Right 2010     BENIGN- DR. BERNADETTE MUSE   • COLON RESECTION  1991   • COLONOSCOPY N/A 2010    WNL   • COLONOSCOPY N/A 03/07/2014    CIRCUMFERENTIAL ULCERATION AT TI, FB STAPLE REMOVED AND AREA COAGULATED,    • COLPOSCOPY W/ BIOPSY / CURETTAGE  06/12/2015    CHACE   • D&C HYSTEROSCOPY ENDOMETRIAL ABLATION N/A 11/15/2012    THERMACHOICE / DR. MAS   • ENDOSCOPY AND COLONOSCOPY N/A 03/17/2017    ACTIVE ULCERATION AT THE NEOTERMINAL ILEUM, HYPERTROPHIC ANAL PAPILLA,    • ENDOSCOPY AND COLONOSCOPY N/A 10/2012    CLS WITH ILEOCOLONIC ANASTAMOSIS EROSION, EGD OK, NEGATIVE SB BX,    • RECTAL MASS EXCISION N/A 7/17/2017    Procedure: RECTAL MASS EXCISION;  Surgeon: Agustina Melendrez MD;  Location: [facility];  Service:        History   Smoking Status   • Never Smoker   Smokeless Tobacco   • Never Used       Family History   Problem Relation Age of Onset   • Hypertension Mother    • Hypertension Father    • Stroke Maternal Aunt    • Hypertension Brother    • Melanoma Cousin 27   • Malig Hyperthermia Neg Hx    • Breast cancer Neg Hx    • Ovarian cancer Neg Hx    • Uterine cancer Neg Hx    • Colon cancer Neg Hx    • Prostate cancer Neg Hx        Prior to Admission medications    Medication Sig Start Date End Date Taking? Authorizing Provider   COLLAGEN PO Take 6 tablets by mouth Daily.   Yes Historical Provider, MD   hydrochlorothiazide (HYDRODIURIL) 12.5 MG tablet Take 12.5 mg by mouth Daily. 12/16/16  Yes Historical Provider, MD   MAGNESIUM-POTASSIUM PO Take 2 tablets by mouth Daily.   Yes Historical Provider, MD   Multiple Vitamins-Minerals (HAIR SKIN AND NAILS FORMULA) tablet Take 3 tablets by mouth Daily.   Yes Historical Provider, MD   VITAMIN B1-B12 IM Inject  into the shoulder, thigh, or buttocks Every 30 (Thirty) Days.   Yes Historical Provider, MD   cetirizine (zyrTEC) 10 MG tablet Take 10 mg by mouth Daily.  9/13/17  Historical Provider, MD   polyethylene glycol (MIRALAX) packet Take 17 g by mouth Daily.  "7/17/17 9/13/17  Agustina Melendrez MD   promethazine (PHENERGAN) 12.5 MG tablet 1-2 tabs po q 6 hrs prn nausea 7/17/17 9/13/17  Agustina Melendrez MD     ________________________________________    Current contraception: infertility  History of abnormal Pap smear: yes - LGSIL  Family history of uterine or ovarian cancer: no  Family History of colon cancer/colon polyps: no  History of abnormal mammogram: no  History of abnormal lipids: no    The following portions of the patient's history were reviewed and updated as appropriate: allergies, current medications, past family history, past medical history, past social history, past surgical history and problem list.    Review of Systems    Pertinent items are noted in HPI.     Objective   Physical Exam    /60  Ht 61\" (154.9 cm)  Wt 137 lb (62.1 kg)  BMI 25.89 kg/m2   BP Readings from Last 3 Encounters:   09/13/17 120/60   07/25/17 118/74   07/17/17 117/75      Wt Readings from Last 3 Encounters:   09/13/17 137 lb (62.1 kg)   07/25/17 135 lb 12.8 oz (61.6 kg)   07/17/17 136 lb (61.7 kg)        BMI: Estimated body mass index is 25.89 kg/(m^2) as calculated from the following:    Height as of this encounter: 61\" (154.9 cm).    Weight as of this encounter: 137 lb (62.1 kg).    General:   alert, appears stated age and cooperative   Heart: regular rate and rhythm, S1, S2 normal, no murmur, click, rub or gallop   Lungs: clear to auscultation bilaterally   Abdomen: soft, non-tender, without masses or organomegaly   Breast: inspection negative, no nipple discharge or bleeding, no masses or nodularity palpable   Vulva: normal   Vagina: normal mucosa   Cervix: no lesions   Uterus: normal size   Adnexa: exam limited by habitus     As part of wellness and prevention, the following topics were discussed with the patient:    []  Nutrition  []  Physical activity/regular exercise   []  Healthy weight  []  Injury prevention  []  Substance misuse/abuse  []  Sexual behavior  []  STD " prevention  []  Contaception  []  Dental health  []  Mental health  []  Immunization  [x]  Encouraged SBE       Assessment:    Theresa was seen today for gynecologic exam.    Diagnoses and all orders for this visit:    Well female exam with routine gynecological exam    Pap smear for cervical cancer screening  -     IgP, Aptima HPV    Screening  -     POC Urinalysis Dipstick    Crohn's disease of small intestine without complication    Essential hypertension    Immunosuppression    LGSIL on Pap smear of cervix          Plan:  Return in about 1 year (around 9/13/2018) for Annual GYN exam.      Martinez Hidalgo MD  9/13/2017 10:57 AM

## 2017-09-16 LAB
CYTOLOGIST CVX/VAG CYTO: NORMAL
CYTOLOGY CVX/VAG DOC THIN PREP: NORMAL
DX ICD CODE: NORMAL
HIV 1 & 2 AB SER-IMP: NORMAL
HPV I/H RISK 4 DNA CVX QL PROBE+SIG AMP: NEGATIVE
OTHER STN SPEC: NORMAL
PATH REPORT.FINAL DX SPEC: NORMAL
STAT OF ADQ CVX/VAG CYTO-IMP: NORMAL

## 2017-09-18 ENCOUNTER — TELEPHONE (OUTPATIENT)
Dept: OBSTETRICS AND GYNECOLOGY | Facility: CLINIC | Age: 47
End: 2017-09-18

## 2017-09-18 NOTE — TELEPHONE ENCOUNTER
----- Message from Martinez Hidalgo MD sent at 9/16/2017  4:46 PM EDT -----  Call pt:  PAP is negative.

## 2017-12-13 ENCOUNTER — OFFICE VISIT (OUTPATIENT)
Dept: SURGERY | Facility: CLINIC | Age: 47
End: 2017-12-13

## 2017-12-13 VITALS
OXYGEN SATURATION: 99 % | BODY MASS INDEX: 26.75 KG/M2 | HEART RATE: 86 BPM | WEIGHT: 141.7 LBS | TEMPERATURE: 98.7 F | DIASTOLIC BLOOD PRESSURE: 80 MMHG | SYSTOLIC BLOOD PRESSURE: 110 MMHG | HEIGHT: 61 IN

## 2017-12-13 DIAGNOSIS — K64.8 INTERNAL HEMORRHOIDS WITH COMPLICATION: Primary | ICD-10-CM

## 2017-12-13 DIAGNOSIS — Z87.19 HISTORY OF CROHN'S DISEASE: ICD-10-CM

## 2017-12-13 PROCEDURE — 46600 DIAGNOSTIC ANOSCOPY SPX: CPT | Performed by: COLON & RECTAL SURGERY

## 2017-12-13 PROCEDURE — 99212 OFFICE O/P EST SF 10 MIN: CPT | Performed by: COLON & RECTAL SURGERY

## 2017-12-13 RX ORDER — HYDROCORTISONE ACETATE 25 MG/1
25 SUPPOSITORY RECTAL EVERY 12 HOURS
Qty: 14 SUPPOSITORY | Refills: 2 | Status: SHIPPED | OUTPATIENT
Start: 2017-12-13 | End: 2017-12-20

## 2017-12-13 NOTE — PROGRESS NOTES
"Theresa Capone is a 47 y.o. female in for follow up of Internal hemorrhoids with complication    History of Crohn's disease    Patient states when she raises from sitting, she feels something is \"off\" with her bottom  She had a smiliar feeling before when she had the rectal polyp, but the sensation resolved after surgery  Now she feels sensation again    No anorectal bleeding    No change in bowel movements    Saw Dr. Murcia in July  Not on Crohn's meds    /80 (BP Location: Right arm, Patient Position: Sitting)  Pulse 86  Temp 98.7 °F (37.1 °C)  Ht 154.9 cm (61\")  Wt 64.3 kg (141 lb 11.2 oz)  SpO2 99%  Breastfeeding? No  BMI 26.77 kg/m2  Body mass index is 26.77 kg/(m^2).      PE:  Physical Exam   Constitutional: She appears well-developed. No distress.   HENT:   Head: Normocephalic and atraumatic.   Abdominal: Soft. She exhibits no distension.   Genitourinary:   Genitourinary Comments: Perianal exam: external hem - wnl.  +perianal skin excoriation  MAMADOU-good tone, no masses  Anoscopy performed:  Grade 1 x 3 internal hem: mild irritation   Musculoskeletal: Normal range of motion.   Neurological: She is alert.   Psychiatric: Thought content normal.         Assessment:   1. Internal hemorrhoids with complication    2. History of Crohn's disease         Plan:    Discussion with patient sensation likely due to mild hemorrhoid irritation.  Rx anusol suppositories and instructions given.    Recommended she follow up with Dr. Murcia    RTC prn      Scribed for Agustina Melendrez MD by Rut Johns PA-C 12/13/2017  This patient was evaluated by me, recommendations made, documentation reviewed, edited, and revised by me, Agustina Melendrez MD        "

## 2018-01-24 DIAGNOSIS — L65.9 HAIR LOSS DISORDER: Primary | ICD-10-CM

## 2018-01-29 ENCOUNTER — RESULTS ENCOUNTER (OUTPATIENT)
Dept: OBSTETRICS AND GYNECOLOGY | Age: 48
End: 2018-01-29

## 2018-01-29 DIAGNOSIS — L65.9 HAIR LOSS DISORDER: ICD-10-CM

## 2018-01-31 ENCOUNTER — TELEPHONE (OUTPATIENT)
Dept: OBSTETRICS AND GYNECOLOGY | Age: 48
End: 2018-01-31

## 2018-01-31 LAB
BASOPHILS # BLD AUTO: 0.05 10*3/MM3 (ref 0–0.2)
BASOPHILS NFR BLD AUTO: 0.8 % (ref 0–1.5)
EOSINOPHIL # BLD AUTO: 0.27 10*3/MM3 (ref 0–0.7)
EOSINOPHIL NFR BLD AUTO: 4.3 % (ref 0.3–6.2)
ERYTHROCYTE [DISTWIDTH] IN BLOOD BY AUTOMATED COUNT: 14.5 % (ref 11.7–13)
FSH SERPL-ACNC: 5.9 MIU/ML
HCT VFR BLD AUTO: 45.7 % (ref 35.6–45.5)
HGB BLD-MCNC: 15.2 G/DL (ref 11.9–15.5)
IMM GRANULOCYTES # BLD: 0.02 10*3/MM3 (ref 0–0.03)
IMM GRANULOCYTES NFR BLD: 0.3 % (ref 0–0.5)
LYMPHOCYTES # BLD AUTO: 1.37 10*3/MM3 (ref 0.9–4.8)
LYMPHOCYTES NFR BLD AUTO: 21.8 % (ref 19.6–45.3)
MCH RBC QN AUTO: 31.3 PG (ref 26.9–32)
MCHC RBC AUTO-ENTMCNC: 33.3 G/DL (ref 32.4–36.3)
MCV RBC AUTO: 94.2 FL (ref 80.5–98.2)
MONOCYTES # BLD AUTO: 0.47 10*3/MM3 (ref 0.2–1.2)
MONOCYTES NFR BLD AUTO: 7.5 % (ref 5–12)
NEUTROPHILS # BLD AUTO: 4.11 10*3/MM3 (ref 1.9–8.1)
NEUTROPHILS NFR BLD AUTO: 65.3 % (ref 42.7–76)
PLATELET # BLD AUTO: 233 10*3/MM3 (ref 140–500)
RBC # BLD AUTO: 4.85 10*6/MM3 (ref 3.9–5.2)
T4 FREE SERPL-MCNC: 1.3 NG/DL (ref 0.93–1.7)
TESTOST FREE SERPL-MCNC: 2.5 PG/ML (ref 0–4.2)
TESTOST SERPL-MCNC: 29 NG/DL (ref 8–48)
TSH SERPL DL<=0.005 MIU/L-ACNC: 3.45 MIU/ML (ref 0.27–4.2)
WBC # BLD AUTO: 6.29 10*3/MM3 (ref 4.5–10.7)

## 2018-01-31 NOTE — TELEPHONE ENCOUNTER
----- Message from Martinez Hidalgo MD sent at 1/31/2018 11:11 AM EST -----  Call pt:  Labs are normal! Small variations in yellow are NOT clinically significant     Hemoglobin 15.2! WOW!

## 2018-04-03 ENCOUNTER — TRANSCRIBE ORDERS (OUTPATIENT)
Dept: ADMINISTRATIVE | Facility: HOSPITAL | Age: 48
End: 2018-04-03

## 2018-04-03 DIAGNOSIS — Z12.31 VISIT FOR SCREENING MAMMOGRAM: Primary | ICD-10-CM

## 2018-04-20 ENCOUNTER — HOSPITAL ENCOUNTER (OUTPATIENT)
Dept: MAMMOGRAPHY | Facility: HOSPITAL | Age: 48
Discharge: HOME | End: 2018-04-20
Attending: OBSTETRICS & GYNECOLOGY | Admitting: OBSTETRICS & GYNECOLOGY

## 2018-04-20 DIAGNOSIS — Z12.31 VISIT FOR SCREENING MAMMOGRAM: ICD-10-CM

## 2018-04-20 PROCEDURE — 77063 BREAST TOMOSYNTHESIS BI: CPT

## 2018-04-20 PROCEDURE — 77067 SCR MAMMO BI INCL CAD: CPT

## 2018-04-24 ENCOUNTER — TELEPHONE (OUTPATIENT)
Dept: OBSTETRICS AND GYNECOLOGY | Age: 48
End: 2018-04-24

## 2018-04-24 NOTE — TELEPHONE ENCOUNTER
----- Message from Martinez Hidalgo MD sent at 4/24/2018  2:33 PM EDT -----  Call patient: Normal mammogram

## 2018-09-26 ENCOUNTER — OFFICE VISIT (OUTPATIENT)
Dept: OBSTETRICS AND GYNECOLOGY | Age: 48
End: 2018-09-26

## 2018-09-26 VITALS
WEIGHT: 141 LBS | SYSTOLIC BLOOD PRESSURE: 124 MMHG | HEIGHT: 63 IN | DIASTOLIC BLOOD PRESSURE: 70 MMHG | BODY MASS INDEX: 24.98 KG/M2

## 2018-09-26 DIAGNOSIS — I10 ESSENTIAL HYPERTENSION: ICD-10-CM

## 2018-09-26 DIAGNOSIS — D64.9 CHRONIC ANEMIA: ICD-10-CM

## 2018-09-26 DIAGNOSIS — Z12.4 PAP SMEAR FOR CERVICAL CANCER SCREENING: ICD-10-CM

## 2018-09-26 DIAGNOSIS — N64.4 BREAST PAIN, RIGHT: ICD-10-CM

## 2018-09-26 DIAGNOSIS — N64.4 BREAST TENDERNESS IN FEMALE: ICD-10-CM

## 2018-09-26 DIAGNOSIS — K50.00 CROHN'S DISEASE OF SMALL INTESTINE WITHOUT COMPLICATION (HCC): ICD-10-CM

## 2018-09-26 DIAGNOSIS — Z13.89 SCREENING FOR BLOOD OR PROTEIN IN URINE: ICD-10-CM

## 2018-09-26 DIAGNOSIS — N60.21: ICD-10-CM

## 2018-09-26 DIAGNOSIS — Z01.419 WELL FEMALE EXAM WITH ROUTINE GYNECOLOGICAL EXAM: Primary | ICD-10-CM

## 2018-09-26 DIAGNOSIS — R87.612 LGSIL ON PAP SMEAR OF CERVIX: ICD-10-CM

## 2018-09-26 DIAGNOSIS — N60.22: ICD-10-CM

## 2018-09-26 PROBLEM — L65.9 HAIR LOSS: Status: RESOLVED | Noted: 2018-01-24 | Resolved: 2018-09-26

## 2018-09-26 LAB
BILIRUB BLD-MCNC: NEGATIVE MG/DL
CLARITY, POC: CLEAR
COLOR UR: YELLOW
GLUCOSE UR STRIP-MCNC: NEGATIVE MG/DL
KETONES UR QL: NEGATIVE
LEUKOCYTE EST, POC: NEGATIVE
NITRITE UR-MCNC: NEGATIVE MG/ML
PH UR: 6.5 [PH] (ref 5–8)
PROT UR STRIP-MCNC: NEGATIVE MG/DL
RBC # UR STRIP: ABNORMAL /UL
SP GR UR: 1.01 (ref 1–1.03)
UROBILINOGEN UR QL: NORMAL

## 2018-09-26 PROCEDURE — 99396 PREV VISIT EST AGE 40-64: CPT | Performed by: OBSTETRICS & GYNECOLOGY

## 2018-09-26 PROCEDURE — 99213 OFFICE O/P EST LOW 20 MIN: CPT | Performed by: OBSTETRICS & GYNECOLOGY

## 2018-09-26 PROCEDURE — 81002 URINALYSIS NONAUTO W/O SCOPE: CPT | Performed by: OBSTETRICS & GYNECOLOGY

## 2018-09-26 NOTE — PROGRESS NOTES
Routine Annual Visit    2018    Patient: Theresa Capone          MR#:[id number]      History of Present Illness    Chief Complaint   Patient presents with   • Gynecologic Exam     Annual.  Last pap 17, negative. Last mammo 18, negative. Colonoscopy 3/17/17-5 y.o.       47 y.o. female  who presents for annual exam.    Patient presents for routine annual exam generally feeling well with complaint of intermittent moderate lateral right breast pain for the past month it seems exacerbated by contact.  The patient had a normal screening exam earlier this year.  Related to the patient's Crohn's disease or symptoms are stable and she is required no additional iron transfusions from her chronic anemia    No LMP recorded (lmp unknown). Patient has had an ablation.  Obstetric History:  OB History      Para Term  AB Living    0 0 0 0 0 0    SAB TAB Ectopic Molar Multiple Live Births    0 0 0   0           Menstrual History:     No LMP recorded (lmp unknown). Patient has had an ablation.       Sexual History:       ________________________________________  Patient Active Problem List   Diagnosis   • Well female exam with routine gynecological exam   • Crohn's disease (CMS/HCC)   • Chronic anemia   • LGSIL on Pap smear of cervix   • Essential hypertension   • Vitamin D deficiency   • Iron deficiency anemia   • Immunosuppression (CMS/HCC)   • Epigastric pain   • Vitamin B12 deficiency anemia   • Breast tenderness in female   • Cystic fibroadenosis of right breast       Past Medical History:   Diagnosis Date   • ASCUS with positive high risk HPV cervical    • Crohn's disease (CMS/HCC)    • Crohn's disease (CMS/HCC)    • Crohn's ileitis (CMS/HCC)    • Fibrocystic breast    • Fistula of small intestine    • Fluid retention     TAKES DIURETIC   • H/O intestinal malabsorption     D/T CROHNS   • Hair loss    • History of blood transfusion     MULTIPLE   • Hypertension     HX, STATES  OFF MED FOR 3 YEARS   • Infertility, female    • Iron deficiency anemia     FOLLOWED BY DR LISSY WANG, GETS IRON TRANSFUSIONS   • LGSIL (low grade squamous intraepithelial dysplasia) 2015   • Perianal fistula     MULTIPLE   • Rectal polyp    • Vitamin B 12 deficiency 07/2016   • Vitamin D deficiency        Past Surgical History:   Procedure Laterality Date   • APPENDECTOMY N/A    • BREAST BIOPSY Right 04/17/2015    BENIGN-DR.HEATH MAS   • BREAST BIOPSY Right 12/14/2010    BENIGN- DR. BERNADETTE MUSE   • COLON RESECTION  1991   • COLONOSCOPY N/A 2010    WNL   • COLONOSCOPY N/A 03/07/2014    CIRCUMFERENTIAL ULCERATION AT TI, FB STAPLE REMOVED AND AREA COAGULATED,    • COLPOSCOPY W/ BIOPSY / CURETTAGE  06/12/2015    CHACE   • D&C HYSTEROSCOPY ENDOMETRIAL ABLATION N/A 11/15/2012    THERMACHOICE / DR. MAS   • ENDOSCOPY AND COLONOSCOPY N/A 03/17/2017    ACTIVE ULCERATION AT THE NEOTERMINAL ILEUM, HYPERTROPHIC ANAL PAPILLA,    • ENDOSCOPY AND COLONOSCOPY N/A 10/2012    CLS WITH ILEOCOLONIC ANASTAMOSIS EROSION, EGD OK, NEGATIVE SB BX,    • RECTAL MASS EXCISION N/A 7/17/2017    Procedure: RECTAL MASS EXCISION;  Surgeon: Agustina Melendrez MD;  Location: [facility] OR;  Service:        History   Smoking Status   • Never Smoker   Smokeless Tobacco   • Never Used       Family History   Problem Relation Age of Onset   • Hypertension Mother    • Hypertension Father    • Stroke Maternal Aunt    • Hypertension Brother    • Melanoma Cousin 27   • Malig Hyperthermia Neg Hx    • Breast cancer Neg Hx    • Ovarian cancer Neg Hx    • Uterine cancer Neg Hx    • Colon cancer Neg Hx    • Prostate cancer Neg Hx        Prior to Admission medications    Medication Sig Start Date End Date Taking? Authorizing Provider   COLLAGEN PO Take 6 tablets by mouth Daily.   Yes Regi La MD   hydrochlorothiazide (HYDRODIURIL) 12.5 MG tablet Take 12.5 mg by mouth Daily. 12/16/16  Yes Regi La MD  "  MAGNESIUM-POTASSIUM PO Take 2 tablets by mouth Daily.   Yes Regi La MD   Multiple Vitamins-Minerals (HAIR SKIN AND NAILS FORMULA) tablet Take 3 tablets by mouth Daily.   Yes Regi La MD   VITAMIN B1-B12 IM Inject  into the shoulder, thigh, or buttocks Every 30 (Thirty) Days.   Yes Regi La MD     ________________________________________    Current contraception: partner infertility   History of abnormal Pap smear: yes - LGSIL  Family history of uterine or ovarian cancer: no  Family History of colon cancer/colon polyps: no  History of abnormal mammogram: yes - No  History of abnormal lipids: no    The following portions of the patient's history were reviewed and updated as appropriate: allergies, current medications, past family history, past medical history, past social history, past surgical history and problem list.    Review of Systems    Pertinent items are noted in HPI.     Objective   Physical Exam   Pulmonary/Chest:           /70   Ht 160 cm (63\")   Wt 64 kg (141 lb)   LMP  (LMP Unknown)   Breastfeeding? No   BMI 24.98 kg/m²    BP Readings from Last 3 Encounters:   09/26/18 124/70   12/13/17 110/80   09/13/17 120/60      Wt Readings from Last 3 Encounters:   09/26/18 64 kg (141 lb)   12/13/17 64.3 kg (141 lb 11.2 oz)   09/13/17 62.1 kg (137 lb)        BMI: Estimated body mass index is 24.98 kg/m² as calculated from the following:    Height as of this encounter: 160 cm (63\").    Weight as of this encounter: 64 kg (141 lb).       General: alert, appears stated age and cooperative   Heart: regular rate and rhythm, S1, S2 normal, no murmur, click, rub or gallop   Lungs: clear to auscultation bilaterally   Abdomen: soft, non-tender, without masses or organomegaly   Breast: inspection negative, no nipple discharge or bleeding, no masses or nodularity palpable and Right lateral breast fullness with associated tenderness.  Suspected fibrocystic change   Vulva: " normal, Bartholin's, Urethra, [place]'s normal   Vagina: normal mucosa, normal discharge   Cervix: no lesions   Uterus: normal size   Adnexa: normal adnexa     As part of wellness and prevention, the following topics were discussed with the patient:     []  Nutrition  [x]  Physical activity/regular exercise   []  Healthy weight  []  Injury prevention  []  Smoking cessation  []  Substance misuse/abuse  []  Sexual behavior  []  STD prevention  []  Contaception  []  Dental health  []  Mental health  []  Immunization  [x]  Encouraged SBE        Assessment:    Theresa was seen today for gynecologic exam.    Diagnoses and all orders for this visit:    Well female exam with routine gynecological exam  -     IgP, Aptima HPV    Screening for blood or protein in urine  -     POC Urinalysis Dipstick    Pap smear for cervical cancer screening  -     IgP, Aptima HPV    Crohn's disease of small intestine without complication (CMS/HCC)    Chronic anemia    LGSIL on Pap smear of cervix    Essential hypertension    Breast tenderness in female    Cystic fibroadenosis of breast, left    Cystic fibroadenosis of breast, right  -     Mammo Diagnostic Right With CAD; Future  -     US Breast Right Limited; Future    Breast pain, right  -     Mammo Diagnostic Right With CAD; Future  -     US Breast Right Limited; Future    Cystic fibroadenosis of right breast      Rx vitamin E 800 units daily    Plan:  Return in about 1 year (around 9/26/2019) for Annual GYN exam.      Martinez Hidalgo MD  9/26/2018 9:59 AM

## 2018-10-01 ENCOUNTER — HOSPITAL ENCOUNTER (OUTPATIENT)
Dept: MAMMOGRAPHY | Facility: HOSPITAL | Age: 48
Discharge: HOME | End: 2018-10-01
Attending: OBSTETRICS & GYNECOLOGY | Admitting: OBSTETRICS & GYNECOLOGY

## 2018-10-01 ENCOUNTER — TELEPHONE (OUTPATIENT)
Dept: OBSTETRICS AND GYNECOLOGY | Age: 48
End: 2018-10-01

## 2018-10-01 ENCOUNTER — HOSPITAL ENCOUNTER (OUTPATIENT)
Dept: ULTRASOUND IMAGING | Facility: HOSPITAL | Age: 48
Discharge: HOME | End: 2018-10-01
Attending: OBSTETRICS & GYNECOLOGY

## 2018-10-01 DIAGNOSIS — N64.4 BREAST PAIN, RIGHT: ICD-10-CM

## 2018-10-01 DIAGNOSIS — N60.21: ICD-10-CM

## 2018-10-01 PROCEDURE — 77065 DX MAMMO INCL CAD UNI: CPT

## 2018-10-01 PROCEDURE — 76642 ULTRASOUND BREAST LIMITED: CPT

## 2018-10-01 NOTE — TELEPHONE ENCOUNTER
----- Message from Martinez Hidalgo MD sent at 10/1/2018  5:52 AM EDT -----  Call pt:  PAP is negative.

## 2018-10-02 DIAGNOSIS — R92.8 ABNORMALITY OF RIGHT BREAST ON SCREENING MAMMOGRAM: Primary | ICD-10-CM

## 2018-10-03 ENCOUNTER — TELEPHONE (OUTPATIENT)
Dept: OBSTETRICS AND GYNECOLOGY | Age: 48
End: 2018-10-03

## 2018-10-03 NOTE — TELEPHONE ENCOUNTER
----- Message from Martinez Hidalgo MD sent at 10/2/2018  8:46 PM EDT -----  Notify patient:  Unilateral mammogram and US suggest benign findings   Follow-up study suggested 6 month suggested

## 2018-10-03 NOTE — PROGRESS NOTES
Notify patient:  Unilateral mammogram and US suggest benign findings   Follow-up study suggested 6 month suggested

## 2019-04-03 ENCOUNTER — HOSPITAL ENCOUNTER (OUTPATIENT)
Dept: ULTRASOUND IMAGING | Facility: HOSPITAL | Age: 49
Discharge: HOME | End: 2019-04-03
Attending: OBSTETRICS & GYNECOLOGY | Admitting: OBSTETRICS & GYNECOLOGY

## 2019-04-03 DIAGNOSIS — R92.8 ABNORMALITY OF RIGHT BREAST ON SCREENING MAMMOGRAM: ICD-10-CM

## 2019-04-03 PROCEDURE — 76642 ULTRASOUND BREAST LIMITED: CPT

## 2019-04-05 ENCOUNTER — TELEPHONE (OUTPATIENT)
Dept: OBSTETRICS AND GYNECOLOGY | Age: 49
End: 2019-04-05

## 2019-04-05 NOTE — TELEPHONE ENCOUNTER
----- Message from Martinez Hidalgo MD sent at 4/4/2019  8:25 PM EDT -----  Call patient: Right diagnostic mammogram is benign.  Area of concern is stable with no change

## 2019-04-09 ENCOUNTER — TRANSCRIBE ORDERS (OUTPATIENT)
Dept: ADMINISTRATIVE | Facility: HOSPITAL | Age: 49
End: 2019-04-09

## 2019-04-09 DIAGNOSIS — Z12.39 SCREENING BREAST EXAMINATION: Primary | ICD-10-CM

## 2019-05-01 ENCOUNTER — HOSPITAL ENCOUNTER (OUTPATIENT)
Dept: MAMMOGRAPHY | Facility: HOSPITAL | Age: 49
Discharge: HOME | End: 2019-05-01
Admitting: OBSTETRICS & GYNECOLOGY

## 2019-05-01 DIAGNOSIS — Z12.39 SCREENING BREAST EXAMINATION: ICD-10-CM

## 2019-05-01 PROCEDURE — 77063 BREAST TOMOSYNTHESIS BI: CPT

## 2019-05-01 PROCEDURE — 77067 SCR MAMMO BI INCL CAD: CPT

## 2019-05-02 ENCOUNTER — TELEPHONE (OUTPATIENT)
Dept: OBSTETRICS AND GYNECOLOGY | Age: 49
End: 2019-05-02

## 2019-05-02 NOTE — TELEPHONE ENCOUNTER
----- Message from Martinez Hidalgo MD sent at 5/1/2019  4:25 PM EDT -----  Call patient: Normal mammogram

## 2019-10-04 ENCOUNTER — OFFICE VISIT (OUTPATIENT)
Dept: OBSTETRICS AND GYNECOLOGY | Age: 49
End: 2019-10-04

## 2019-10-04 VITALS
BODY MASS INDEX: 21.97 KG/M2 | WEIGHT: 124 LBS | SYSTOLIC BLOOD PRESSURE: 122 MMHG | DIASTOLIC BLOOD PRESSURE: 72 MMHG | HEIGHT: 63 IN

## 2019-10-04 DIAGNOSIS — I10 ESSENTIAL HYPERTENSION: ICD-10-CM

## 2019-10-04 DIAGNOSIS — Z01.419 WELL WOMAN EXAM WITH ROUTINE GYNECOLOGICAL EXAM: ICD-10-CM

## 2019-10-04 DIAGNOSIS — D64.9 CHRONIC ANEMIA: ICD-10-CM

## 2019-10-04 DIAGNOSIS — Z01.419 WELL FEMALE EXAM WITH ROUTINE GYNECOLOGICAL EXAM: Primary | ICD-10-CM

## 2019-10-04 DIAGNOSIS — K50.00 CROHN'S DISEASE OF SMALL INTESTINE WITHOUT COMPLICATION (HCC): ICD-10-CM

## 2019-10-04 DIAGNOSIS — Z11.51 SCREENING FOR HUMAN PAPILLOMAVIRUS: ICD-10-CM

## 2019-10-04 DIAGNOSIS — Z13.89 SCREENING FOR HEMATURIA OR PROTEINURIA: ICD-10-CM

## 2019-10-04 LAB
BILIRUB BLD-MCNC: NEGATIVE MG/DL
CLARITY, POC: CLEAR
COLOR UR: YELLOW
GLUCOSE UR STRIP-MCNC: NEGATIVE MG/DL
KETONES UR QL: NEGATIVE
LEUKOCYTE EST, POC: NEGATIVE
NITRITE UR-MCNC: NEGATIVE MG/ML
PH UR: 6 [PH] (ref 5–8)
PROT UR STRIP-MCNC: NEGATIVE MG/DL
RBC # UR STRIP: ABNORMAL /UL
SP GR UR: 1.01 (ref 1–1.03)
UROBILINOGEN UR QL: NORMAL

## 2019-10-04 PROCEDURE — 99396 PREV VISIT EST AGE 40-64: CPT | Performed by: OBSTETRICS & GYNECOLOGY

## 2019-10-04 PROCEDURE — 81002 URINALYSIS NONAUTO W/O SCOPE: CPT | Performed by: OBSTETRICS & GYNECOLOGY

## 2019-10-04 NOTE — PROGRESS NOTES
Routine Annual Visit    10/4/2019    Patient: Theresa Capone          MR#:[id number]      History of Present Illness    Chief Complaint   Patient presents with   • Gynecologic Exam     AE TODAY. 2018 PAP = NEG.  MG- 2019 = NORMAL.  C-SCOPE 2016 = NORMAL (HX CROHNS)        48 y.o. female  who presents for annual exam.    The patient presents for routine annual exam feeling well without complaints.  The patient has long-standing Crohn's disease that is been completely stable off all medication for more than a year now        No LMP recorded. Patient has had an ablation.  Obstetric History:  OB History      Para Term  AB Living    0 0 0 0 0 0    SAB TAB Ectopic Molar Multiple Live Births    0 0 0   0          Obstetric Comments    TWO ADOPTED - CASANDRA (15) AND CABREAR (11)  TWO GIRLS          Menstrual History:     No LMP recorded. Patient has had an ablation.       Sexual History:       ________________________________________  Patient Active Problem List   Diagnosis   • Well female exam with routine gynecological exam   • Crohn's disease (CMS/HCC)   • Chronic anemia   • LGSIL on Pap smear of cervix   • Essential hypertension   • Vitamin D deficiency   • Immunosuppression (CMS/HCC)   • Vitamin B12 deficiency anemia   • Breast tenderness in female   • Cystic fibroadenosis of right breast       Past Medical History:   Diagnosis Date   • ASCUS with positive high risk HPV cervical    • Crohn's disease (CMS/HCC)    • Crohn's disease (CMS/HCC)    • Crohn's ileitis (CMS/HCC)    • Fibrocystic breast    • Fistula of small intestine    • Fluid retention     TAKES DIURETIC   • H/O intestinal malabsorption     D/T CROHNS   • Hair loss    • History of blood transfusion     MULTIPLE   • Hypertension     HX, STATES OFF MED FOR 3 YEARS   • Infertility, female    • Iron deficiency anemia     FOLLOWED BY DR LISSY AWNG, GETS IRON TRANSFUSIONS   • LGSIL (low grade squamous intraepithelial  dysplasia) 2015   • Perianal fistula     MULTIPLE   • Rectal polyp    • Vitamin B 12 deficiency 07/2016   • Vitamin D deficiency        Past Surgical History:   Procedure Laterality Date   • APPENDECTOMY N/A    • BREAST BIOPSY Right 04/17/2015    BENIGN-DR.HEATH MAS   • BREAST BIOPSY Right 12/14/2010    BENIGN- DR. BERNADETTE MUSE   • COLON RESECTION  1991   • COLONOSCOPY N/A 2010    WNL   • COLONOSCOPY N/A 03/07/2014    CIRCUMFERENTIAL ULCERATION AT TI, FB STAPLE REMOVED AND AREA COAGULATED,    • COLPOSCOPY W/ BIOPSY / CURETTAGE  06/12/2015    [facility]   • D&C HYSTEROSCOPY ENDOMETRIAL ABLATION N/A 11/15/2012    THERMACHOICE / DR. MAS   • ENDOSCOPY AND COLONOSCOPY N/A 03/17/2017    ACTIVE ULCERATION AT THE NEOTERMINAL ILEUM, HYPERTROPHIC ANAL PAPILLA,    • ENDOSCOPY AND COLONOSCOPY N/A 10/2012    CLS WITH ILEOCOLONIC ANASTAMOSIS EROSION, EGD OK, NEGATIVE SB BX,    • RECTAL MASS EXCISION N/A 7/17/2017    Procedure: RECTAL MASS EXCISION;  Surgeon: Agustina Melendrez MD;  Location: [facility];  Service:        Social History     Tobacco Use   Smoking Status Never Smoker   Smokeless Tobacco Never Used       Family History   Problem Relation Age of Onset   • Hypertension Mother    • Hypertension Father    • Stroke Maternal Aunt    • Hypertension Brother    • Melanoma Cousin 27   • Malig Hyperthermia Neg Hx    • Breast cancer Neg Hx    • Ovarian cancer Neg Hx    • Uterine cancer Neg Hx    • Colon cancer Neg Hx    • Prostate cancer Neg Hx        Prior to Admission medications    Medication Sig Start Date End Date Taking? Authorizing Provider   hydrochlorothiazide (HYDRODIURIL) 12.5 MG tablet Take 12.5 mg by mouth Daily. 12/16/16  Yes Regi La MD   COLLAGEN PO Take 6 tablets by mouth Daily.  10/4/19  Regi La MD   MAGNESIUM-POTASSIUM PO Take 2 tablets by mouth Daily.  10/4/19  Regi La MD   Multiple Vitamins-Minerals (HAIR SKIN AND NAILS FORMULA) tablet Take 3  "tablets by mouth Daily.  10/4/19  Provider, MD Regi   VITAMIN B1-B12 IM Inject  into the shoulder, thigh, or buttocks Every 30 (Thirty) Days.  10/4/19  Regi La MD     ________________________________________    Current contraception: male factor infertility   History of abnormal Pap smear: yes - Hx LGSIL  Family history of uterine or ovarian cancer: no  Family History of colon cancer/colon polyps: no  History of abnormal mammogram: no  History of abnormal lipids: no    The following portions of the patient's history were reviewed and updated as appropriate: allergies, current medications, past family history, past medical history, past social history, past surgical history and problem list.    Review of Systems    Pertinent items are noted in HPI.     Objective   Physical Exam    /72   Ht 160 cm (63\")   Wt 56.2 kg (124 lb)   Breastfeeding? No   BMI 21.97 kg/m²    BP Readings from Last 3 Encounters:   10/04/19 122/72   09/26/18 124/70   12/13/17 110/80      Wt Readings from Last 3 Encounters:   10/04/19 56.2 kg (124 lb)   09/26/18 64 kg (141 lb)   12/13/17 64.3 kg (141 lb 11.2 oz)        BMI: Estimated body mass index is 21.97 kg/m² as calculated from the following:    Height as of this encounter: 160 cm (63\").    Weight as of this encounter: 56.2 kg (124 lb).       General: alert, appears stated age and cooperative   Heart: regular rate and rhythm, S1, S2 normal, no murmur, click, rub or gallop   Lungs: clear to auscultation bilaterally   Abdomen: soft, non-tender, without masses, no organomegaly   Breast: inspection negative, no nipple discharge or bleeding, no masses or nodularity palpable   Vulva: normal and bartholin's, Urethra, [place]'s normal   Vagina: normal mucosa, normal discharge   Cervix: no lesions and nulliparous appearance   Uterus: normal size   Adnexa: normal adnexa     As part of wellness and prevention, the following topics were discussed with the patient:     []  " Nutrition  []  Physical activity/regular exercise   []  Healthy weight  []  Injury prevention  []  Smoking cessation  []  Substance misuse/abuse  []  Sexual behavior  []  STD prevention  []  Contaception  []  Dental health  []  Mental health  []  Immunization  [x]  Encouraged SBE        Assessment:    Theresa was seen today for gynecologic exam.    Diagnoses and all orders for this visit:    Well female exam with routine gynecological exam  -     IgP, Aptima HPV    Well woman exam with routine gynecological exam    Crohn's disease of small intestine without complication (CMS/HCC)  - stable off all medication       Chronic anemia    Essential hypertension    Screening for hematuria or proteinuria  -     POC Urinalysis Dipstick          Plan:  Return in about 1 year (around 10/4/2020) for Annual GYN exam.      Martinez Hidalgo MD  10/4/2019 8:31 AM

## 2019-10-10 LAB
CYTOLOGIST CVX/VAG CYTO: NORMAL
CYTOLOGY CVX/VAG DOC CYTO: NORMAL
CYTOLOGY CVX/VAG DOC THIN PREP: NORMAL
DX ICD CODE: NORMAL
HIV 1 & 2 AB SER-IMP: NORMAL
HPV I/H RISK 4 DNA CVX QL PROBE+SIG AMP: NEGATIVE
OTHER STN SPEC: NORMAL
STAT OF ADQ CVX/VAG CYTO-IMP: NORMAL

## 2019-10-11 ENCOUNTER — TELEPHONE (OUTPATIENT)
Dept: OBSTETRICS AND GYNECOLOGY | Age: 49
End: 2019-10-11

## 2019-10-11 NOTE — TELEPHONE ENCOUNTER
----- Message from Martinez Hidalgo MD sent at 10/10/2019  2:49 PM EDT -----  Call pt:  PAP is negative.

## 2020-02-24 ENCOUNTER — TRANSCRIBE ORDERS (OUTPATIENT)
Dept: ADMINISTRATIVE | Facility: HOSPITAL | Age: 50
End: 2020-02-24

## 2020-02-24 DIAGNOSIS — Z12.31 VISIT FOR SCREENING MAMMOGRAM: Primary | ICD-10-CM

## 2020-05-19 ENCOUNTER — HOSPITAL ENCOUNTER (OUTPATIENT)
Dept: MAMMOGRAPHY | Facility: HOSPITAL | Age: 50
Discharge: HOME | End: 2020-05-19
Admitting: OBSTETRICS & GYNECOLOGY

## 2020-05-19 DIAGNOSIS — Z12.31 VISIT FOR SCREENING MAMMOGRAM: ICD-10-CM

## 2020-05-19 PROCEDURE — 77063 BREAST TOMOSYNTHESIS BI: CPT

## 2020-05-19 PROCEDURE — 77067 SCR MAMMO BI INCL CAD: CPT

## 2020-05-20 ENCOUNTER — TELEPHONE (OUTPATIENT)
Dept: OBSTETRICS AND GYNECOLOGY | Age: 50
End: 2020-05-20

## 2020-05-20 NOTE — TELEPHONE ENCOUNTER
----- Message from Martinez Hidalgo MD sent at 5/20/2020  8:18 AM EDT -----  Call patient: Normal mammogram

## 2020-10-21 ENCOUNTER — OFFICE VISIT (OUTPATIENT)
Dept: OBSTETRICS AND GYNECOLOGY | Age: 50
End: 2020-10-21

## 2020-10-21 VITALS
HEIGHT: 61 IN | WEIGHT: 133.6 LBS | DIASTOLIC BLOOD PRESSURE: 80 MMHG | BODY MASS INDEX: 25.22 KG/M2 | SYSTOLIC BLOOD PRESSURE: 118 MMHG

## 2020-10-21 DIAGNOSIS — K50.00 CROHN'S DISEASE OF SMALL INTESTINE WITHOUT COMPLICATION (HCC): Chronic | ICD-10-CM

## 2020-10-21 DIAGNOSIS — D84.9 IMMUNOSUPPRESSION (HCC): ICD-10-CM

## 2020-10-21 DIAGNOSIS — Z13.89 SCREENING FOR BLOOD OR PROTEIN IN URINE: ICD-10-CM

## 2020-10-21 DIAGNOSIS — D64.9 CHRONIC ANEMIA: ICD-10-CM

## 2020-10-21 DIAGNOSIS — Z01.419 WELL FEMALE EXAM WITH ROUTINE GYNECOLOGICAL EXAM: Primary | ICD-10-CM

## 2020-10-21 DIAGNOSIS — R87.612 LGSIL ON PAP SMEAR OF CERVIX: ICD-10-CM

## 2020-10-21 PROBLEM — N64.4 BREAST TENDERNESS IN FEMALE: Status: RESOLVED | Noted: 2018-09-26 | Resolved: 2020-10-21

## 2020-10-21 LAB
BILIRUB BLD-MCNC: NEGATIVE MG/DL
CLARITY, POC: CLEAR
COLOR UR: YELLOW
GLUCOSE UR STRIP-MCNC: NEGATIVE MG/DL
KETONES UR QL: NEGATIVE
LEUKOCYTE EST, POC: NEGATIVE
NITRITE UR-MCNC: NEGATIVE MG/ML
PH UR: 6.5 [PH] (ref 5–8)
PROT UR STRIP-MCNC: NEGATIVE MG/DL
RBC # UR STRIP: ABNORMAL /UL
SP GR UR: 1.02 (ref 1–1.03)
UROBILINOGEN UR QL: NORMAL

## 2020-10-21 PROCEDURE — 81003 URINALYSIS AUTO W/O SCOPE: CPT | Performed by: OBSTETRICS & GYNECOLOGY

## 2020-10-21 PROCEDURE — 99396 PREV VISIT EST AGE 40-64: CPT | Performed by: OBSTETRICS & GYNECOLOGY

## 2020-10-21 NOTE — PROGRESS NOTES
Routine Annual Visit    10/21/2020    Patient: Theresa Capone          MR#:[id number]    History of Present Illness    Chief Complaint   Patient presents with   • Gynecologic Exam     pap 10/4/19 negative mammo 20 normal colonoscopy 3/17/17       50 y.o. female  who presents for annual exam.    The patient presents for routine annual exam feeling well without complaints.  The patient has Crohn's with related chronic anemia that is been essentially stable.        No LMP recorded (lmp unknown). Patient has had an ablation.  Obstetric History:  OB History        0    Para   0    Term   0       0    AB   0    Living   0       SAB   0    TAB   0    Ectopic   0    Molar        Multiple   0    Live Births              Obstetric Comments   TWO ADOPTED - CASANDRA (15) AND CABRERA (11)  TWO GIRLS             Menstrual History:     No LMP recorded (lmp unknown). Patient has had an ablation.       Sexual History:       ________________________________________  Patient Active Problem List   Diagnosis   • Well female exam with routine gynecological exam   • Crohn's disease (CMS/HCC)   • Chronic anemia   • LGSIL on Pap smear of cervix   • Essential hypertension   • Vitamin D deficiency   • Immunosuppression (CMS/HCC)   • Vitamin B12 deficiency anemia   • Cystic fibroadenosis of right breast     Past Medical History:   Diagnosis Date   • ASCUS with positive high risk HPV cervical    • Crohn's disease (CMS/HCC)    • Crohn's disease (CMS/HCC)    • Crohn's ileitis (CMS/HCC)    • Fibrocystic breast    • Fistula of small intestine    • Fluid retention     TAKES DIURETIC   • H/O intestinal malabsorption     D/T CROHNS   • Hair loss    • History of blood transfusion     MULTIPLE   • Hypertension     HX, STATES OFF MED FOR 3 YEARS   • Infertility, female    • Iron deficiency anemia     FOLLOWED BY DR LISSY WANG, GETS IRON TRANSFUSIONS   • LGSIL (low grade squamous intraepithelial dysplasia)    •  Perianal fistula     MULTIPLE   • Rectal polyp    • Vitamin B 12 deficiency 07/2016   • Vitamin D deficiency      Past Surgical History:   Procedure Laterality Date   • APPENDECTOMY N/A    • BREAST BIOPSY Right 04/17/2015    BENIGN-DR.HEATH MAS   • BREAST BIOPSY Right 12/14/2010    BENIGN- DR. BERNADETTE MUSE   • COLON RESECTION  1991   • COLONOSCOPY N/A 2010    WNL   • COLONOSCOPY N/A 03/07/2014    CIRCUMFERENTIAL ULCERATION AT TI, FB STAPLE REMOVED AND AREA COAGULATED,    • COLPOSCOPY W/ BIOPSY / CURETTAGE  06/12/2015    [facility]   • D&C HYSTEROSCOPY ENDOMETRIAL ABLATION N/A 11/15/2012    THERMACHOICE / DR. MAS   • ENDOSCOPY AND COLONOSCOPY N/A 03/17/2017    ACTIVE ULCERATION AT THE NEOTERMINAL ILEUM, HYPERTROPHIC ANAL PAPILLA,    • ENDOSCOPY AND COLONOSCOPY N/A 10/2012    CLS WITH ILEOCOLONIC ANASTAMOSIS EROSION, EGD OK, NEGATIVE SB BX,    • RECTAL MASS EXCISION N/A 7/17/2017    Procedure: RECTAL MASS EXCISION;  Surgeon: Agustina Melendrez MD;  Location: [facility];  Service:      Social History     Tobacco Use   Smoking Status Never Smoker   Smokeless Tobacco Never Used     Family History   Problem Relation Age of Onset   • Hypertension Mother    • Hypertension Father    • Stroke Maternal Aunt    • Hypertension Brother    • Melanoma Cousin 27   • Malig Hyperthermia Neg Hx    • Breast cancer Neg Hx    • Ovarian cancer Neg Hx    • Uterine cancer Neg Hx    • Colon cancer Neg Hx    • Prostate cancer Neg Hx      Prior to Admission medications    Medication Sig Start Date End Date Taking? Authorizing Provider   hydrochlorothiazide (HYDRODIURIL) 12.5 MG tablet Take 12.5 mg by mouth Daily. 12/16/16  Yes ProviderRegi MD     ________________________________________    Current contraception: post menopausal status  History of abnormal Pap smear: yes - LGSIL  Family history of uterine or ovarian cancer: no  Family History of colon cancer/colon polyps: no  History of abnormal mammogram:  "no  History of abnormal lipids: no    The following portions of the patient's history were reviewed and updated as appropriate: allergies, current medications, past family history, past medical history, past social history, past surgical history and problem list.    Review of Systems    Pertinent items are noted in HPI.     Objective   Physical Exam    /80   Ht 154.9 cm (61\")   Wt 60.6 kg (133 lb 9.6 oz)   LMP  (LMP Unknown)   Breastfeeding No   BMI 25.24 kg/m²    BP Readings from Last 3 Encounters:   10/21/20 118/80   10/04/19 122/72   09/26/18 124/70      Wt Readings from Last 3 Encounters:   10/21/20 60.6 kg (133 lb 9.6 oz)   10/04/19 56.2 kg (124 lb)   09/26/18 64 kg (141 lb)        BMI: Estimated body mass index is 25.24 kg/m² as calculated from the following:    Height as of this encounter: 154.9 cm (61\").    Weight as of this encounter: 60.6 kg (133 lb 9.6 oz).       General: alert, appears stated age and cooperative   Heart: regular rate and rhythm, S1, S2 normal, no murmur, click, rub or gallop   Lungs: clear to auscultation bilaterally   Abdomen: soft, non-tender, without masses, no organomegaly   Breast: inspection negative, no nipple discharge or bleeding, no masses or nodularity palpable   Vulva: normal and bartholin's, Urethra, [place]'s normal   Vagina: normal mucosa, normal discharge   Cervix: no lesions and nulliparous appearance   Uterus: normal size, fixed/poorly mobile  or non-tender   Adnexa: normal adnexa     As part of wellness and prevention, the following topics were discussed with the patient:  Encouraged self breast exam  Physical activity and regular exercised encouraged.         Assessment:    Diagnoses and all orders for this visit:    1. Well female exam with routine gynecological exam (Primary)    2. Crohn's disease of small intestine without complication (CMS/HCC)    3. Immunosuppression (CMS/HCC)    4. LGSIL on Pap smear of cervix    5. Chronic anemia    6. Screening for " blood or protein in urine  -     POC Urinalysis Dipstick, Automated        Plan:  Return in about 1 year (around 10/21/2021) for Annual GYN exam.      Martinez Hidalgo MD  10/21/2020 11:14 EDT

## 2020-10-26 LAB
CYTOLOGIST CVX/VAG CYTO: NORMAL
CYTOLOGY CVX/VAG DOC CYTO: NORMAL
CYTOLOGY CVX/VAG DOC THIN PREP: NORMAL
DX ICD CODE: NORMAL
HIV 1 & 2 AB SER-IMP: NORMAL
HPV I/H RISK 4 DNA CVX QL PROBE+SIG AMP: NEGATIVE
OTHER STN SPEC: NORMAL
PATHOLOGIST CVX/VAG CYTO: NORMAL
STAT OF ADQ CVX/VAG CYTO-IMP: NORMAL

## 2020-10-27 ENCOUNTER — TELEPHONE (OUTPATIENT)
Dept: OBSTETRICS AND GYNECOLOGY | Age: 50
End: 2020-10-27

## 2020-10-27 DIAGNOSIS — Z13.820 OSTEOPOROSIS SCREENING: Primary | ICD-10-CM

## 2020-12-17 ENCOUNTER — HOSPITAL ENCOUNTER (OUTPATIENT)
Dept: BONE DENSITY | Facility: HOSPITAL | Age: 50
Discharge: HOME | End: 2020-12-17
Admitting: OBSTETRICS & GYNECOLOGY

## 2020-12-17 DIAGNOSIS — Z13.820 OSTEOPOROSIS SCREENING: ICD-10-CM

## 2020-12-17 PROCEDURE — 77080 DXA BONE DENSITY AXIAL: CPT

## 2021-03-10 ENCOUNTER — TRANSCRIBE ORDERS (OUTPATIENT)
Dept: ADMINISTRATIVE | Facility: HOSPITAL | Age: 51
End: 2021-03-10

## 2021-03-10 ENCOUNTER — IMMUNIZATION (OUTPATIENT)
Dept: VACCINE CLINIC | Facility: HOSPITAL | Age: 51
End: 2021-03-10

## 2021-03-10 DIAGNOSIS — Z12.31 VISIT FOR SCREENING MAMMOGRAM: Primary | ICD-10-CM

## 2021-03-10 PROCEDURE — 0001A: CPT | Performed by: OBSTETRICS & GYNECOLOGY

## 2021-03-10 PROCEDURE — 91300 HC SARSCOV02 VAC 30MCG/0.3ML IM: CPT | Performed by: OBSTETRICS & GYNECOLOGY

## 2021-03-31 ENCOUNTER — IMMUNIZATION (OUTPATIENT)
Dept: VACCINE CLINIC | Facility: HOSPITAL | Age: 51
End: 2021-03-31

## 2021-03-31 PROCEDURE — 91300 HC SARSCOV02 VAC 30MCG/0.3ML IM: CPT | Performed by: OBSTETRICS & GYNECOLOGY

## 2021-03-31 PROCEDURE — 0002A: CPT | Performed by: OBSTETRICS & GYNECOLOGY

## 2021-05-20 ENCOUNTER — HOSPITAL ENCOUNTER (OUTPATIENT)
Dept: MAMMOGRAPHY | Facility: HOSPITAL | Age: 51
Discharge: HOME | End: 2021-05-20
Admitting: OBSTETRICS & GYNECOLOGY

## 2021-05-20 DIAGNOSIS — Z12.31 VISIT FOR SCREENING MAMMOGRAM: ICD-10-CM

## 2021-05-20 PROCEDURE — 77063 BREAST TOMOSYNTHESIS BI: CPT

## 2021-05-20 PROCEDURE — 77067 SCR MAMMO BI INCL CAD: CPT

## 2021-06-11 ENCOUNTER — OFFICE VISIT (OUTPATIENT)
Dept: OBSTETRICS AND GYNECOLOGY | Age: 51
End: 2021-06-11

## 2021-06-11 VITALS
WEIGHT: 126 LBS | HEIGHT: 60 IN | SYSTOLIC BLOOD PRESSURE: 118 MMHG | DIASTOLIC BLOOD PRESSURE: 70 MMHG | BODY MASS INDEX: 24.74 KG/M2

## 2021-06-11 DIAGNOSIS — N95.1 PERIMENOPAUSAL: ICD-10-CM

## 2021-06-11 DIAGNOSIS — F41.9 ANXIETY: Primary | ICD-10-CM

## 2021-06-11 PROCEDURE — 99213 OFFICE O/P EST LOW 20 MIN: CPT | Performed by: NURSE PRACTITIONER

## 2021-06-11 RX ORDER — HYDROCHLOROTHIAZIDE 25 MG/1
25 TABLET ORAL DAILY
COMMUNITY
Start: 2021-05-06

## 2021-06-16 NOTE — PROGRESS NOTES
"Subjective   Theresa Capone is a 50 y.o. female.     History of Present Illness     Theresa presents to discuss perimenopause  She has noticed an gradual increase in anxiety over the past 6 months or so  She has had an endometrial ablation so no periods.   Denies vasomotor symptoms  She has questions regarding HRT as many of her friends have started on replacement therapy recently  No other complaints or concerns today    The following portions of the patient's history were reviewed and updated as appropriate: allergies, current medications, past family history, past medical history, past social history, past surgical history and problem list.    Review of Systems   Constitutional: Negative for chills, fatigue and fever.   Gastrointestinal: Negative for abdominal distention and abdominal pain.   Genitourinary: Negative for decreased urine volume, difficulty urinating, dyspareunia, dysuria, frequency, genital sores, hematuria, menstrual problem, pelvic pressure, urgency, urinary incontinence, vaginal bleeding, vaginal discharge and vaginal pain.   Psychiatric/Behavioral: Positive for stress. Negative for behavioral problems, sleep disturbance, suicidal ideas, negative for hyperactivity and depressed mood. The patient is nervous/anxious.        Objective   Physical Exam  Constitutional:       Appearance: Normal appearance. She is not ill-appearing.   Skin:     General: Skin is warm and dry.   Neurological:      General: No focal deficit present.      Mental Status: She is alert and oriented to person, place, and time.   Psychiatric:         Mood and Affect: Mood normal.         Behavior: Behavior normal.         /70   Ht 152.4 cm (60\")   Wt 57.2 kg (126 lb)   LMP  (LMP Unknown)   Breastfeeding No   BMI 24.61 kg/m²         Assessment/Plan   Diagnoses and all orders for this visit:    1. Anxiety (Primary)    2. Perimenopausal        We had a long discussion regaring perimenopause and hormone replacement " therapy. We discussed alternative options to manage her mild anxiety such as effexor.   Discussed risks associated with HRT and would recommend lowest dose for shortest amount of time.  Offered [facility] today but Theresa scott  Prefers to just monitor symptoms for now  Declines intervention  Here today just for information she reports    WILLIAM Neely

## 2021-10-27 ENCOUNTER — OFFICE VISIT (OUTPATIENT)
Dept: OBSTETRICS AND GYNECOLOGY | Age: 51
End: 2021-10-27

## 2021-10-27 VITALS
HEIGHT: 60 IN | SYSTOLIC BLOOD PRESSURE: 128 MMHG | DIASTOLIC BLOOD PRESSURE: 70 MMHG | BODY MASS INDEX: 25.91 KG/M2 | WEIGHT: 132 LBS

## 2021-10-27 DIAGNOSIS — Z11.51 SCREENING FOR HUMAN PAPILLOMAVIRUS (HPV): ICD-10-CM

## 2021-10-27 DIAGNOSIS — I10 ESSENTIAL HYPERTENSION: ICD-10-CM

## 2021-10-27 DIAGNOSIS — Z13.89 SCREENING FOR BLOOD OR PROTEIN IN URINE: ICD-10-CM

## 2021-10-27 DIAGNOSIS — N60.21: ICD-10-CM

## 2021-10-27 DIAGNOSIS — Z12.4 SCREENING FOR MALIGNANT NEOPLASM OF THE CERVIX: ICD-10-CM

## 2021-10-27 DIAGNOSIS — Z12.4 SCREENING FOR MALIGNANT NEOPLASM OF CERVIX: ICD-10-CM

## 2021-10-27 DIAGNOSIS — K50.00 CROHN'S DISEASE OF SMALL INTESTINE WITHOUT COMPLICATION (HCC): Chronic | ICD-10-CM

## 2021-10-27 DIAGNOSIS — Z01.419 WELL FEMALE EXAM WITH ROUTINE GYNECOLOGICAL EXAM: Primary | ICD-10-CM

## 2021-10-27 DIAGNOSIS — Z11.51 SPECIAL SCREENING EXAMINATION FOR HUMAN PAPILLOMAVIRUS (HPV): ICD-10-CM

## 2021-10-27 DIAGNOSIS — D84.9 IMMUNOSUPPRESSION (HCC): ICD-10-CM

## 2021-10-27 DIAGNOSIS — R87.612 LGSIL ON PAP SMEAR OF CERVIX: ICD-10-CM

## 2021-10-27 DIAGNOSIS — D64.9 CHRONIC ANEMIA: ICD-10-CM

## 2021-10-27 LAB
BILIRUB BLD-MCNC: NEGATIVE MG/DL
CLARITY, POC: CLEAR
COLOR UR: YELLOW
GLUCOSE UR STRIP-MCNC: NEGATIVE MG/DL
KETONES UR QL: NEGATIVE
LEUKOCYTE EST, POC: NEGATIVE
NITRITE UR-MCNC: NEGATIVE MG/ML
PH UR: 5.5 [PH] (ref 5–8)
PROT UR STRIP-MCNC: NEGATIVE MG/DL
RBC # UR STRIP: ABNORMAL /UL
SP GR UR: 1.02 (ref 1–1.03)
UROBILINOGEN UR QL: NORMAL

## 2021-10-27 PROCEDURE — 99396 PREV VISIT EST AGE 40-64: CPT | Performed by: OBSTETRICS & GYNECOLOGY

## 2021-10-27 PROCEDURE — 81002 URINALYSIS NONAUTO W/O SCOPE: CPT | Performed by: OBSTETRICS & GYNECOLOGY

## 2021-10-27 NOTE — PROGRESS NOTES
[facility]   Obstetrics and Gynecology       10/27/2021    Patient: Theresa Capone          MR#:[id number]    History of Present Illness    Chief Complaint   Patient presents with   • Gynecologic Exam     last pap 10/2020 neg, last mg 2021; DEXA 2020       51 y.o. female  who presents for annual exam.    The patient presents with some mood lability questioning her hormonal status.    Studies reviewed:    No LMP recorded (lmp unknown). Patient has had an ablation.  Obstetric History:  OB History        0    Para   0    Term   0       0    AB   0    Living   0       SAB   0    IAB   0    Ectopic   0    Molar        Multiple   0    Live Births              Obstetric Comments   TWO ADOPTED - CASANDRA (15) AND CABRERA (11)  TWO GIRLS             Menstrual History:     No LMP recorded (lmp unknown). Patient has had an ablation.       Sexual History:       ________________________________________  Patient Active Problem List   Diagnosis   • Well female exam with routine gynecological exam   • Crohn's disease (HCC)   • Chronic anemia   • LGSIL on Pap smear of cervix   • Essential hypertension   • Vitamin D deficiency   • Immunosuppression (HCC)   • Vitamin B12 deficiency anemia   • Cystic fibroadenosis of right breast     Past Medical History:   Diagnosis Date   • ASCUS with positive high risk HPV cervical    • Crohn's disease (HCC)    • Crohn's disease (HCC)    • Crohn's ileitis (HCC)    • Fibrocystic breast    • Fistula of small intestine    • Fluid retention     TAKES DIURETIC   • H/O intestinal malabsorption     D/T CROHNS   • Hair loss    • History of blood transfusion     MULTIPLE   • Hypertension     HX, STATES OFF MED FOR 3 YEARS   • Infertility, female    • Iron deficiency anemia     FOLLOWED BY DR LISSY WANG, GETS IRON TRANSFUSIONS   • LGSIL (low grade squamous intraepithelial dysplasia)    • Perianal fistula     MULTIPLE   • Rectal polyp    • Vitamin B 12  deficiency 07/2016   • Vitamin D deficiency      Past Surgical History:   Procedure Laterality Date   • APPENDECTOMY N/A    • BREAST BIOPSY Right 04/17/2015    BENIGN-DR.HEATH MAS   • BREAST BIOPSY Right 12/14/2010    BENIGN- DR. BERNADETTE MUSE   • COLON RESECTION  1991   • COLONOSCOPY N/A 2010    WNL   • COLONOSCOPY N/A 03/07/2014    CIRCUMFERENTIAL ULCERATION AT TI, FB STAPLE REMOVED AND AREA COAGULATED,    • COLPOSCOPY W/ BIOPSY / CURETTAGE  06/12/2015    [facility]   • D & C HYSTEROSCOPY ENDOMETRIAL ABLATION N/A 11/15/2012    THERMACHOICE / DR. MAS   • ENDOSCOPY AND COLONOSCOPY N/A 03/17/2017    ACTIVE ULCERATION AT THE NEOTERMINAL ILEUM, HYPERTROPHIC ANAL PAPILLA,    • ENDOSCOPY AND COLONOSCOPY N/A 10/2012    CLS WITH ILEOCOLONIC ANASTAMOSIS EROSION, EGD OK, NEGATIVE SB BX,    • RECTAL MASS EXCISION N/A 7/17/2017    Procedure: RECTAL MASS EXCISION;  Surgeon: Agustina Melendrez MD;  Location: [facility];  Service:      Social History     Tobacco Use   Smoking Status Never Smoker   Smokeless Tobacco Never Used     Family History   Problem Relation Age of Onset   • Hypertension Mother    • Hypertension Father    • Stroke Maternal Aunt    • Hypertension Brother    • Melanoma Cousin 27   • Malig Hyperthermia Neg Hx    • Breast cancer Neg Hx    • Ovarian cancer Neg Hx    • Uterine cancer Neg Hx    • Colon cancer Neg Hx    • Prostate cancer Neg Hx      Prior to Admission medications    Medication Sig Start Date End Date Taking? Authorizing Provider   hydroCHLOROthiazide (HYDRODIURIL) 25 MG tablet Take 25 mg by mouth Daily. 5/6/21  Yes Provider, MD Regi     ________________________________________    Current contraception: Longstanding infertility  History of abnormal Pap smear: yes - LGSIL  Family history of uterine or ovarian cancer: no  Family History of colon cancer/colon polyps: no  History of abnormal mammogram: no  History of abnormal lipids: no    The following portions of the  "patient's history were reviewed and updated as appropriate: allergies, current medications, past family history, past medical history, past social history, past surgical history and problem list.    Review of Systems    Pertinent items are noted in HPI.       Objective   Physical Exam    /70   Ht 152.4 cm (60\")   Wt 59.9 kg (132 lb)   LMP  (LMP Unknown)   Breastfeeding No   BMI 25.78 kg/m²    BP Readings from Last 3 Encounters:   10/27/21 128/70   09/16/21 140/77   06/11/21 118/70      Wt Readings from Last 3 Encounters:   10/27/21 59.9 kg (132 lb)   09/16/21 57.2 kg (126 lb)   06/11/21 57.2 kg (126 lb)        BMI: Estimated body mass index is 25.78 kg/m² as calculated from the following:    Height as of this encounter: 152.4 cm (60\").    Weight as of this encounter: 59.9 kg (132 lb).       General: alert, appears stated age and cooperative   Heart: regular rate and rhythm, S1, S2 normal, no murmur, click, rub or gallop   Lungs: clear to auscultation bilaterally   Abdomen: soft, non-tender, without masses, no organomegaly   Breast: inspection negative, no nipple discharge or bleeding, no masses or nodularity palpable   External genitalia/Vulva: External genitalia including bartholin's glands, Urethra, [place]'s gland and urethra meatus are normal, Perineum, rectum and anus appear normal  and Bladder appears normal without significant prolapse    Vagina: normal mucosa, normal discharge   Cervix: no lesions   Uterus: normal size, mobile and non-tender   Adnexa: normal adnexa     As part of wellness and prevention, the following topics were discussed with the patient:  Encouraged self breast exam  Physical activity and regular exercised encouraged.         Assessment:    Diagnoses and all orders for this visit:    1. Well female exam with routine gynecological exam (Primary)  -     IgP, Aptima HPV  -     IGP,CtNgTv,rfx Aptima HPV ASCU    2. Screening for blood or protein in urine  -     POC Urinalysis " Dipstick    3. Chronic anemia    4. Crohn's disease of small intestine without complication (HCC)    5. LGSIL on Pap smear of cervix  -     IgP, Aptima HPV    6. Immunosuppression (HCC)    7. Essential hypertension    8. Cystic fibroadenosis of right breast    9. Screening for malignant neoplasm of the cervix  -     IgP, Aptima HPV    10. Special screening examination for human papillomavirus (HPV)  -     IgP, Aptima HPV    11. Screening for malignant neoplasm of cervix  -     IGP,CtNgTv,rfx Aptima HPV ASCU    12. Screening for human papillomavirus (HPV)  -     IGP,CtNgTv,rfx Aptima HPV ASCU        Plan:  Return in about 1 year (around 10/27/2022) for Annual GYN exam.      Martinez Hidalgo MD  10/27/2021 11:30 EDT

## 2021-10-30 LAB
C TRACH RRNA CVX QL NAA+PROBE: NEGATIVE
CONV .: NORMAL
CYTOLOGIST CVX/VAG CYTO: NORMAL
CYTOLOGY CVX/VAG DOC CYTO: NORMAL
CYTOLOGY CVX/VAG DOC THIN PREP: NORMAL
DX ICD CODE: NORMAL
HIV 1 & 2 AB SER-IMP: NORMAL
N GONORRHOEA RRNA CVX QL NAA+PROBE: NEGATIVE
OTHER STN SPEC: NORMAL
STAT OF ADQ CVX/VAG CYTO-IMP: NORMAL
T VAGINALIS RRNA SPEC QL NAA+PROBE: NEGATIVE

## 2022-03-25 ENCOUNTER — TRANSCRIBE ORDERS (OUTPATIENT)
Dept: ADMINISTRATIVE | Facility: HOSPITAL | Age: 52
End: 2022-03-25

## 2022-03-25 DIAGNOSIS — Z12.31 ENCOUNTER FOR SCREENING MAMMOGRAM FOR BREAST CANCER: Primary | ICD-10-CM

## 2022-04-15 ENCOUNTER — TELEPHONE (OUTPATIENT)
Dept: OBSTETRICS AND GYNECOLOGY | Age: 52
End: 2022-04-15

## 2022-04-15 NOTE — TELEPHONE ENCOUNTER
Patient called and states she gets a DEXA scan every year in stead of every two. Pt would like for you to place an order so she can schedule it.

## 2022-06-20 ENCOUNTER — HOSPITAL ENCOUNTER (OUTPATIENT)
Dept: MAMMOGRAPHY | Facility: HOSPITAL | Age: 52
Discharge: HOME | End: 2022-06-20
Admitting: OBSTETRICS & GYNECOLOGY

## 2022-06-20 DIAGNOSIS — Z12.31 ENCOUNTER FOR SCREENING MAMMOGRAM FOR BREAST CANCER: ICD-10-CM

## 2022-06-20 PROCEDURE — 77067 SCR MAMMO BI INCL CAD: CPT

## 2022-06-20 PROCEDURE — 77063 BREAST TOMOSYNTHESIS BI: CPT

## 2022-11-02 ENCOUNTER — OFFICE VISIT (OUTPATIENT)
Dept: OBSTETRICS AND GYNECOLOGY | Age: 52
End: 2022-11-02

## 2022-11-02 VITALS
BODY MASS INDEX: 27.09 KG/M2 | WEIGHT: 138 LBS | DIASTOLIC BLOOD PRESSURE: 68 MMHG | SYSTOLIC BLOOD PRESSURE: 110 MMHG | HEIGHT: 60 IN

## 2022-11-02 DIAGNOSIS — Z12.4 SCREENING FOR MALIGNANT NEOPLASM OF THE CERVIX: ICD-10-CM

## 2022-11-02 DIAGNOSIS — D64.9 CHRONIC ANEMIA: ICD-10-CM

## 2022-11-02 DIAGNOSIS — I10 ESSENTIAL HYPERTENSION: ICD-10-CM

## 2022-11-02 DIAGNOSIS — Z11.51 SPECIAL SCREENING EXAMINATION FOR HUMAN PAPILLOMAVIRUS (HPV): ICD-10-CM

## 2022-11-02 DIAGNOSIS — Z01.419 WELL WOMAN EXAM WITH ROUTINE GYNECOLOGICAL EXAM: Primary | ICD-10-CM

## 2022-11-02 DIAGNOSIS — Z13.89 SCREENING FOR BLOOD OR PROTEIN IN URINE: ICD-10-CM

## 2022-11-02 DIAGNOSIS — Z11.3 SCREEN FOR STD (SEXUALLY TRANSMITTED DISEASE): ICD-10-CM

## 2022-11-02 DIAGNOSIS — K50.00 CROHN'S DISEASE OF SMALL INTESTINE WITHOUT COMPLICATION: Chronic | ICD-10-CM

## 2022-11-02 PROCEDURE — 99396 PREV VISIT EST AGE 40-64: CPT | Performed by: OBSTETRICS & GYNECOLOGY

## 2022-11-02 NOTE — PROGRESS NOTES
[facility]   Obstetrics and Gynecology     2022    Patient: Theresa Capone          MR#:[id number]    History of Present Illness    Chief Complaint   Patient presents with   • Gynecologic Exam     last pap 10/2021 neg, last mg 2022, DXEA 2020, cscope 2017       52 y.o. female  who presents for annual exam.    Patient presents for regular check feeling well without complaints.    Studies reviewed:    No LMP recorded (lmp unknown). Patient has had an ablation.  Obstetric History:  OB History        0    Para   0    Term   0       0    AB   0    Living   0       SAB   0    IAB   0    Ectopic   0    Molar        Multiple   0    Live Births              Obstetric Comments   TWO ADOPTED - CASANDRA (15) AND CABRERA (11)  TWO GIRLS             Menstrual History:     No LMP recorded (lmp unknown). Patient has had an ablation.       Sexual History:       Social History     Substance and Sexual Activity   Sexual Activity Yes   • Partners: Male   • Birth control/protection: Surgical    Comment: Partner infertility     ______________________________________  Patient Active Problem List   Diagnosis   • Crohn's disease (HCC)   • Chronic anemia   • LGSIL on Pap smear of cervix   • Essential hypertension   • Vitamin D deficiency   • Immunosuppression (HCC)   • Vitamin B12 deficiency anemia   • Cystic fibroadenosis of right breast     Past Medical History:   Diagnosis Date   • ASCUS with positive high risk HPV cervical    • Crohn's disease (HCC)    • Crohn's disease (HCC)    • Crohn's ileitis (HCC)    • Fibrocystic breast    • Fistula of small intestine    • Fluid retention     TAKES DIURETIC   • H/O intestinal malabsorption     D/T CROHNS   • Hair loss    • History of blood transfusion     MULTIPLE   • Hypertension     HX, STATES OFF MED FOR 3 YEARS   • Infertility, female    • Iron deficiency anemia     FOLLOWED BY DR LISSY WANG, GETS IRON TRANSFUSIONS   • LGSIL (low grade  squamous intraepithelial dysplasia) 2015   • Perianal fistula     MULTIPLE   • Rectal polyp    • Vitamin B 12 deficiency 07/2016   • Vitamin D deficiency      Past Surgical History:   Procedure Laterality Date   • APPENDECTOMY N/A    • BREAST BIOPSY Right 04/17/2015    BENIGN-DR.HEATH MAS   • BREAST BIOPSY Right 12/14/2010    BENIGN- DR. BERNADETTE MUSE   • COLON RESECTION  1991   • COLONOSCOPY N/A 2010    WNL   • COLONOSCOPY N/A 03/07/2014    CIRCUMFERENTIAL ULCERATION AT TI, FB STAPLE REMOVED AND AREA COAGULATED,    • COLPOSCOPY W/ BIOPSY / CURETTAGE  06/12/2015    [facility]   • D & C HYSTEROSCOPY ENDOMETRIAL ABLATION N/A 11/15/2012    THERMACHOICE / DR. MAS   • ENDOSCOPY AND COLONOSCOPY N/A 03/17/2017    ACTIVE ULCERATION AT THE NEOTERMINAL ILEUM, HYPERTROPHIC ANAL PAPILLA,    • ENDOSCOPY AND COLONOSCOPY N/A 10/2012    CLS WITH ILEOCOLONIC ANASTAMOSIS EROSION, EGD OK, NEGATIVE SB BX,    • RECTAL MASS EXCISION N/A 7/17/2017    Procedure: RECTAL MASS EXCISION;  Surgeon: Agustina Melendrez MD;  Location: [facility]  Service:      Social History     Tobacco Use   Smoking Status Never   Smokeless Tobacco Never     Family History   Problem Relation Age of Onset   • Hypertension Mother    • Hypertension Father    • Stroke Maternal Aunt    • Hypertension Brother    • Melanoma Cousin 27   • Malig Hyperthermia Neg Hx    • Breast cancer Neg Hx    • Ovarian cancer Neg Hx    • Uterine cancer Neg Hx    • Colon cancer Neg Hx    • Prostate cancer Neg Hx      Prior to Admission medications    Medication Sig Start Date End Date Taking? Authorizing Provider   hydroCHLOROthiazide (HYDRODIURIL) 25 MG tablet Take 25 mg by mouth Daily. 5/6/21  Yes Provider, MD Regi     _______________________________________    Current contraception: post menopausal status  History of abnormal Pap smear: yes - LGSIL  Family history of uterine or ovarian cancer: no  Family History of colon cancer/colon polyps: no  History  "of abnormal mammogram: no  History of abnormal lipids: no    The following portions of the patient's history were reviewed and updated as appropriate: allergies, current medications, past family history, past medical history, past social history, past surgical history and problem list.    Review of Systems    Pertinent items are noted in HPI.       Objective   Physical Exam    /68   Ht 152.4 cm (60\")   Wt 62.6 kg (138 lb)   LMP  (LMP Unknown)   BMI 26.95 kg/m²    BP Readings from Last 3 Encounters:   11/02/22 110/68   10/27/21 128/70   09/16/21 140/77      Wt Readings from Last 3 Encounters:   11/02/22 62.6 kg (138 lb)   10/27/21 59.9 kg (132 lb)   09/16/21 57.2 kg (126 lb)        BMI: Estimated body mass index is 26.95 kg/m² as calculated from the following:    Height as of this encounter: 152.4 cm (60\").    Weight as of this encounter: 62.6 kg (138 lb).       General: alert, appears stated age and cooperative   Heart: regular rate and rhythm, S1, S2 normal, no murmur, click, rub or gallop   Lungs: clear to auscultation bilaterally   Abdomen: soft, non-tender, without masses, no organomegaly   Breast: inspection negative, no nipple discharge or bleeding, no masses or nodularity palpable   External genitalia/Vulva: External genitalia including bartholin's glands, Urethra, [place]'s gland and urethra meatus are normal, Perineum, rectum and anus appear normal  and Bladder appears normal without significant prolapse    Vagina: normal mucosa, normal discharge   Cervix: no lesions   Uterus: normal size, mobile and non-tender   Adnexa: normal adnexa     As part of wellness and prevention, the following topics were discussed with the patient:  Encouraged self breast exam  Physical activity and regular exercised encouraged.               Assessment:  Diagnoses and all orders for this visit:    1. Well woman exam with routine gynecological exam (Primary)  -     IgP, Aptima HPV    2. Screening for malignant neoplasm " of the cervix  -     IgP, Aptima HPV    3. Special screening examination for human papillomavirus (HPV)  -     IgP, Aptima HPV    4. Screening for blood or protein in urine  -     POC Urinalysis Dipstick    5. Chronic anemia    6. Crohn's disease of small intestine without complication (HCC)    7. Essential hypertension      Plan:  Return in about 1 year (around 11/2/2023) for Annual GYN exam.    Martinez Hidalgo MD  11/2/2022 12:14 EDT

## 2022-11-04 LAB
A VAGINAE DNA VAG QL NAA+PROBE: NORMAL SCORE
BVAB2 DNA VAG QL NAA+PROBE: NORMAL SCORE
C ALBICANS DNA VAG QL NAA+PROBE: NEGATIVE
C GLABRATA DNA VAG QL NAA+PROBE: NEGATIVE
C TRACH DNA VAG QL NAA+PROBE: NEGATIVE
MEGA1 DNA VAG QL NAA+PROBE: NORMAL SCORE
N GONORRHOEA DNA VAG QL NAA+PROBE: NEGATIVE
T VAGINALIS DNA VAG QL NAA+PROBE: NEGATIVE

## 2022-11-07 NOTE — TELEPHONE ENCOUNTER
Patient called and stated she would like an order to have bone density test.  Please advise .  
[Time Spent: ___ minutes] : I have spent [unfilled] minutes of time on the encounter.

## 2023-06-06 ENCOUNTER — TRANSCRIBE ORDERS (OUTPATIENT)
Dept: ADMINISTRATIVE | Facility: HOSPITAL | Age: 53
End: 2023-06-06
Payer: COMMERCIAL

## 2023-06-06 DIAGNOSIS — Z12.31 VISIT FOR SCREENING MAMMOGRAM: Primary | ICD-10-CM

## 2023-11-08 ENCOUNTER — OFFICE VISIT (OUTPATIENT)
Dept: OBSTETRICS AND GYNECOLOGY | Age: 53
End: 2023-11-08
Payer: COMMERCIAL

## 2023-11-08 VITALS
HEIGHT: 60 IN | DIASTOLIC BLOOD PRESSURE: 68 MMHG | WEIGHT: 134 LBS | BODY MASS INDEX: 26.31 KG/M2 | SYSTOLIC BLOOD PRESSURE: 110 MMHG

## 2023-11-08 DIAGNOSIS — Z11.51 SCREENING FOR HUMAN PAPILLOMAVIRUS (HPV): ICD-10-CM

## 2023-11-08 DIAGNOSIS — D64.9 CHRONIC ANEMIA: ICD-10-CM

## 2023-11-08 DIAGNOSIS — Z01.419 WELL FEMALE EXAM WITH ROUTINE GYNECOLOGICAL EXAM: Primary | ICD-10-CM

## 2023-11-08 DIAGNOSIS — Z13.89 SCREENING FOR BLOOD OR PROTEIN IN URINE: ICD-10-CM

## 2023-11-08 DIAGNOSIS — Z12.4 SCREENING FOR MALIGNANT NEOPLASM OF CERVIX: ICD-10-CM

## 2023-11-08 DIAGNOSIS — K50.00 CROHN'S DISEASE OF SMALL INTESTINE WITHOUT COMPLICATION: Chronic | ICD-10-CM

## 2023-11-08 DIAGNOSIS — I10 ESSENTIAL HYPERTENSION: ICD-10-CM

## 2023-11-08 LAB
BILIRUB BLD-MCNC: NEGATIVE MG/DL
CLARITY, POC: CLEAR
COLOR UR: YELLOW
GLUCOSE UR STRIP-MCNC: NEGATIVE MG/DL
KETONES UR QL: NEGATIVE
LEUKOCYTE EST, POC: NEGATIVE
NITRITE UR-MCNC: NEGATIVE MG/ML
PH UR: 5.5 [PH] (ref 5–8)
PROT UR STRIP-MCNC: NEGATIVE MG/DL
RBC # UR STRIP: ABNORMAL /UL
SP GR UR: 1.01 (ref 1–1.03)
UROBILINOGEN UR QL: ABNORMAL

## 2023-11-08 NOTE — PROGRESS NOTES
[facility]   Obstetrics and Gynecology     2023    Patient: Theresa Capone          MR#:[id number]    History of Present Illness    Chief Complaint   Patient presents with    Gynecologic Exam     CC: AE, last pap 2022 neg, hpv neg, last mammo 23, last colon 2017. No complaints today. Patient is doing well.         53 y.o. female  who presents for annual exam.    The patient presents for her exam feeling well without major complaints    Relevant data reviewed:    No LMP recorded. Patient has had an ablation.  Obstetric History:  OB History          0    Para   0    Term   0       0    AB   0    Living   0         SAB   0    IAB   0    Ectopic   0    Molar        Multiple   0    Live Births              Obstetric Comments   TWO ADOPTED - CASANDRA (15) AND CABRERA (11)  TWO GIRLS               Menstrual History:     No LMP recorded. Patient has had an ablation.       Social History     Substance and Sexual Activity   Sexual Activity Yes    Partners: Male    Birth control/protection: Surgical    Comment: Partner infertility     ______________________________________  Patient Active Problem List   Diagnosis    Crohn's disease    Chronic anemia    Essential hypertension    Immunosuppression    Vitamin B12 deficiency anemia    Cystic fibroadenosis of right breast     Past Medical History:   Diagnosis Date    ASCUS with positive high risk HPV cervical     Crohn's disease     Crohn's disease     Crohn's ileitis     Fibrocystic breast     Fistula of small intestine     Fluid retention     TAKES DIURETIC    H/O intestinal malabsorption     D/T CROHNS    Hair loss     History of blood transfusion     MULTIPLE    Hypertension     HX, STATES OFF MED FOR 3 YEARS    Infertility, female     Iron deficiency anemia     FOLLOWED BY DR LISSY WANG, GETS IRON TRANSFUSIONS    LGSIL (low grade squamous intraepithelial dysplasia)     LGSIL on Pap smear of cervix     Perianal  fistula     MULTIPLE    Rectal polyp     Vitamin B 12 deficiency 07/2016    Vitamin D deficiency      Past Surgical History:   Procedure Laterality Date    APPENDECTOMY N/A     BREAST BIOPSY Right 04/17/2015    BENIGN-DR.HEATH MAS    BREAST BIOPSY Right 12/14/2010    BENIGN- DR. BERNADETTE MUSE    COLON RESECTION  1991    COLONOSCOPY N/A 2010    WNL    COLONOSCOPY N/A 03/07/2014    CIRCUMFERENTIAL ULCERATION AT TI, FB STAPLE REMOVED AND AREA COAGULATED,     COLPOSCOPY W/ BIOPSY / CURETTAGE  06/12/2015    [facility]    D & C HYSTEROSCOPY ENDOMETRIAL ABLATION N/A 11/15/2012    THERMACHOICE / DR. MAS    ENDOSCOPY AND COLONOSCOPY N/A 03/17/2017    ACTIVE ULCERATION AT THE NEOTERMINAL ILEUM, HYPERTROPHIC ANAL PAPILLA,     ENDOSCOPY AND COLONOSCOPY N/A 10/2012    CLS WITH ILEOCOLONIC ANASTAMOSIS EROSION, EGD OK, NEGATIVE SB BX,     RECTAL MASS EXCISION N/A 7/17/2017    Procedure: RECTAL MASS EXCISION;  Surgeon: Agustina Melendrez MD;  Location: [facility];  Service:      Social History     Tobacco Use   Smoking Status Never   Smokeless Tobacco Never     Family History   Problem Relation Age of Onset    Hypertension Mother     Hypertension Father     Stroke Maternal Aunt     Hypertension Brother     Melanoma Cousin 27    Malig Hyperthermia Neg Hx     Breast cancer Neg Hx     Ovarian cancer Neg Hx     Uterine cancer Neg Hx     Colon cancer Neg Hx     Prostate cancer Neg Hx      Prior to Admission medications    Medication Sig Start Date End Date Taking? Authorizing Provider   hydroCHLOROthiazide (HYDRODIURIL) 25 MG tablet Take 1 tablet by mouth Daily. 5/6/21  Yes Provider, MD Regi     _______________________________________    Current contraception: none  History of abnormal Pap smear: no  Family history of uterine or ovarian cancer: no  Family History of colon cancer/colon polyps: no  History of abnormal mammogram: no  History of abnormal lipids: no    The following portions of the patient's  "history were reviewed and updated as appropriate: allergies, current medications, past family history, past medical history, past social history, past surgical history, and problem list.    Review of Systems    Pertinent items are noted in HPI.       Objective   Physical Exam    /68   Ht 152.4 cm (60\")   Wt 60.8 kg (134 lb)   BMI 26.17 kg/m²    BP Readings from Last 3 Encounters:   23 110/68   22 110/68   10/27/21 128/70      Wt Readings from Last 3 Encounters:   23 60.8 kg (134 lb)   22 62.6 kg (138 lb)   10/27/21 59.9 kg (132 lb)        BMI: Estimated body mass index is 26.17 kg/m² as calculated from the following:    Height as of this encounter: 152.4 cm (60\").    Weight as of this encounter: 60.8 kg (134 lb).       General: alert, appears stated age, and cooperative   Heart: regular rate and rhythm, S1, S2 normal, no murmur, click, rub or gallop   Lungs: clear to auscultation bilaterally   Abdomen: soft, non-tender, without masses, no organomegaly   Breast: inspection negative, no nipple discharge or bleeding, no masses or nodularity palpable   External genitalia/Vulva: External genitalia including bartholin's glands, Urethra, [place]'s gland and urethra meatus are normal, Perineum, rectum and anus appear normal , and Bladder appears normal without significant prolapse    Vagina: normal mucosa, normal discharge   Cervix: no lesions   Uterus: normal size and non-tender   Adnexa: normal adnexa     As part of wellness and prevention, the following topics were discussed with the patient:  Encouraged self breast exam  Physical activity and regular exercised encouraged.         Problem List   Meds  History  Prep for Surg   Imagin}    Assessment:  Diagnoses and all orders for this visit:    1. Well female exam with routine gynecological exam (Primary)  -     IGP, Apt HPV,rfx 16 / 18,45    2. Screening for human papillomavirus (HPV)  -     IGP, Apt HPV,rfx 16 / 18,45    3. " Screening for malignant neoplasm of cervix  -     IGP, Apt HPV,rfx 16 / 18,45    4. Screening for blood or protein in urine  -     POC Urinalysis Dipstick    5. Essential hypertension    6. Crohn's disease of small intestine without complication    7. Chronic anemia      Plan:  Return in 1 year (on 11/8/2024) for Annual exam.    Martinez Hidalgo MD  11/8/2023 10:48 EST

## 2023-11-14 LAB
CYTOLOGIST CVX/VAG CYTO: ABNORMAL
CYTOLOGY CVX/VAG DOC CYTO: ABNORMAL
CYTOLOGY CVX/VAG DOC THIN PREP: ABNORMAL
DX ICD CODE: ABNORMAL
HIV 1 & 2 AB SER-IMP: ABNORMAL
HPV I/H RISK 4 DNA CVX QL PROBE+SIG AMP: POSITIVE
HPV16 DNA CVX QL PROBE+SIG AMP: NEGATIVE
HPV18+45 E6+E7 MRNA CVX QL NAA+PROBE: NEGATIVE
OTHER STN SPEC: ABNORMAL
STAT OF ADQ CVX/VAG CYTO-IMP: ABNORMAL

## 2023-11-17 DIAGNOSIS — R92.8 ABNORMAL MAMMOGRAM: Primary | ICD-10-CM

## 2023-11-17 DIAGNOSIS — Z78.0 POST-MENOPAUSAL: ICD-10-CM

## 2023-11-20 ENCOUNTER — OFFICE VISIT (OUTPATIENT)
Dept: OBSTETRICS AND GYNECOLOGY | Age: 53
End: 2023-11-20
Payer: COMMERCIAL

## 2023-11-20 VITALS
BODY MASS INDEX: 25.91 KG/M2 | HEIGHT: 60 IN | WEIGHT: 132 LBS | DIASTOLIC BLOOD PRESSURE: 82 MMHG | SYSTOLIC BLOOD PRESSURE: 122 MMHG

## 2023-11-20 DIAGNOSIS — Z13.89 SCREENING FOR BLOOD OR PROTEIN IN URINE: ICD-10-CM

## 2023-11-20 DIAGNOSIS — N94.10 DYSPAREUNIA IN FEMALE: Primary | ICD-10-CM

## 2023-11-20 PROCEDURE — 99213 OFFICE O/P EST LOW 20 MIN: CPT | Performed by: OBSTETRICS & GYNECOLOGY

## 2023-11-20 NOTE — PROGRESS NOTES
[facility]   Obstetrics and Gynecology     2023      Patient:  Theresa Capone   MR#:[id number]    Office note    Chief Complaint   Patient presents with    Follow-up     CC: Pain during/after intercourse going on for a couple of months per pt. Pt had AE 2023.          Subjective     History of Present Illness  53 y.o. female  presents with complaint of discomfort with intimacy for the past 2 to 3 weeks.  The patient feels like there is something obstructing her introitus periodically that causes unbearable pain.  The patient's tried several times and has had such significant discomfort that she had to stop.  The patient denies bleeding or any kind of vaginal trauma.    The patient actually has not had any intercourse in the last 2 weeks so has not reevaluated the situation.  The problem is fairly chronic and has occurred periodically and much milder fashions.        Relevant data reviewed:      Patient Active Problem List   Diagnosis    Crohn's disease    Chronic anemia    Essential hypertension    Immunosuppression    Vitamin B12 deficiency anemia    Cystic fibroadenosis of right breast       Past Medical History:   Diagnosis Date    ASCUS with positive high risk HPV cervical     Crohn's disease     Crohn's disease     Crohn's ileitis     Fibrocystic breast     Fistula of small intestine     Fluid retention     TAKES DIURETIC    H/O intestinal malabsorption     D/T CROHNS    Hair loss     History of blood transfusion     MULTIPLE    Hypertension     HX, STATES OFF MED FOR 3 YEARS    Infertility, female     Iron deficiency anemia     FOLLOWED BY DR LISSY WANG, GETS IRON TRANSFUSIONS    LGSIL (low grade squamous intraepithelial dysplasia)     LGSIL on Pap smear of cervix     Perianal fistula     MULTIPLE    Rectal polyp     Vitamin B 12 deficiency 2016    Vitamin D deficiency      Past Surgical History:   Procedure Laterality Date    APPENDECTOMY N/A      BREAST BIOPSY Right 2015    BENIGN-DR.HEATH MAS    BREAST BIOPSY Right 2010    BENIGN- DR. BERNADETTE MUSE    COLON RESECTION      COLONOSCOPY N/A     WNL    COLONOSCOPY N/A 2014    CIRCUMFERENTIAL ULCERATION AT TI, FB STAPLE REMOVED AND AREA COAGULATED,     COLPOSCOPY W/ BIOPSY / CURETTAGE  2015    [facility]    D & C HYSTEROSCOPY ENDOMETRIAL ABLATION N/A 11/15/2012    THERMACHOICE / DR. MAS    ENDOSCOPY AND COLONOSCOPY N/A 2017    ACTIVE ULCERATION AT THE NEOTERMINAL ILEUM, HYPERTROPHIC ANAL PAPILLA,     ENDOSCOPY AND COLONOSCOPY N/A 10/2012    CLS WITH ILEOCOLONIC ANASTAMOSIS EROSION, EGD OK, NEGATIVE SB BX,     RECTAL MASS EXCISION N/A 2017    Procedure: RECTAL MASS EXCISION;  Surgeon: Agustina Melendrez MD;  Location: [facility];  Service:      Obstetric History:  OB History          0    Para   0    Term   0       0    AB   0    Living   0         SAB   0    IAB   0    Ectopic   0    Molar        Multiple   0    Live Births              Obstetric Comments   TWO ADOPTED - CASANDRA (15) AND CABRERA (11)  TWO GIRLS               Menstrual History:     No LMP recorded. Patient has had an ablation.       The patient has never been pregnant.  Family History   Problem Relation Age of Onset    Hypertension Mother     Hypertension Father     Stroke Maternal Aunt     Hypertension Brother     Melanoma Cousin 27    Malig Hyperthermia Neg Hx     Breast cancer Neg Hx     Ovarian cancer Neg Hx     Uterine cancer Neg Hx     Colon cancer Neg Hx     Prostate cancer Neg Hx      Social History     Tobacco Use    Smoking status: Never    Smokeless tobacco: Never   Vaping Use    Vaping Use: Never used   Substance Use Topics    Alcohol use: No    Drug use: No     Patient has no known allergies.    Current Outpatient Medications:     hydroCHLOROthiazide (HYDRODIURIL) 25 MG tablet, Take 1 tablet by mouth Daily., Disp: , Rfl:     The following portions of the  "patient's history were reviewed and updated as appropriate: allergies, current medications, past family history, past medical history, past social history, past surgical history, and problem list.    Review of Systems   Constitutional: Negative.    Respiratory: Negative.     Cardiovascular: Negative.    Gastrointestinal: Negative.    Genitourinary:  Positive for dyspareunia.   Psychiatric/Behavioral: Negative.         BP Readings from Last 3 Encounters:   11/20/23 122/82   11/08/23 110/68   11/02/22 110/68      Wt Readings from Last 3 Encounters:   11/20/23 59.9 kg (132 lb)   11/08/23 60.8 kg (134 lb)   11/02/22 62.6 kg (138 lb)      BMI: Estimated body mass index is 25.78 kg/m² as calculated from the following:    Height as of this encounter: 152.4 cm (60\").    Weight as of this encounter: 59.9 kg (132 lb). BSA: Estimated body surface area is 1.56 meters squared as calculated from the following:    Height as of this encounter: 152.4 cm (60\").    Weight as of this encounter: 59.9 kg (132 lb).    Objective   Physical Exam  Vitals and nursing note reviewed.   Constitutional:       Appearance: Normal appearance. She is well-developed.   HENT:      Head: Normocephalic and atraumatic.      Nose: Nose normal.   Eyes:      Pupils: Pupils are equal, round, and reactive to light.   Neck:      Thyroid: No thyromegaly.      Trachea: No tracheal deviation.   Cardiovascular:      Rate and Rhythm: Normal rate and regular rhythm.      Heart sounds: Normal heart sounds.   Pulmonary:      Effort: Pulmonary effort is normal.      Breath sounds: Normal breath sounds.   Chest:      Chest wall: No mass.   Breasts:     Right: No mass, nipple discharge or skin change.      Left: No mass, nipple discharge or skin change.   Abdominal:      General: Bowel sounds are normal.      Palpations: Abdomen is soft. There is no mass.      Tenderness: There is no abdominal tenderness. There is no rebound.      Hernia: No hernia is present. There is no " hernia in the left inguinal area.   Genitourinary:     Labia:         Right: No rash or lesion.         Left: No rash or lesion.       Vagina: Normal.      Comments: Normal vulva and vagina with no lesions or injury    Pain not reproduced with the speculum  Musculoskeletal:         General: Normal range of motion.      Cervical back: Normal range of motion and neck supple.   Skin:     General: Skin is warm.      Findings: No rash.   Neurological:      Mental Status: She is alert and oriented to person, place, and time.      Deep Tendon Reflexes: Reflexes are normal and symmetric.   Psychiatric:         Behavior: Behavior normal.         Thought Content: Thought content normal.         Judgment: Judgment normal.         Assessment & Plan     Diagnoses and all orders for this visit:    1. Dyspareunia in female (Primary)    2. Screening for blood or protein in urine  -     POC Urinalysis Dipstick      No anatomic abnormalities are noted.  Etiology of the discomfort is unclear.  Discussed routine measures to treat dyspareunia including lubricants and position changes.  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      No follow-ups on file.    Martinez Hidalgo MD   11/20/2023 08:52 EST

## 2024-01-26 NOTE — ANESTHESIA POSTPROCEDURE EVALUATION
24 hr chart check completed   Patient: Theresa Capone    Procedure Summary     Date Anesthesia Start Anesthesia Stop Room / Location    07/17/17 0728 0825  [facility] / [facility]       Procedure Diagnosis Surgeon Provider    RECTAL MASS EXCISION (N/A Rectum) Rectal polyp  (Rectal polyp [K62.1]) MD Pal Goins MD          Anesthesia Type: general  Last vitals  /66 (07/17/17 0940)    Temp      Pulse 61 (07/17/17 0940)   Resp 18 (07/17/17 0940)    SpO2 98 % (07/17/17 0940)      Anesthesia Post Evaluation

## 2024-03-04 ENCOUNTER — TRANSCRIBE ORDERS (OUTPATIENT)
Dept: ADMINISTRATIVE | Facility: HOSPITAL | Age: 54
End: 2024-03-04
Payer: COMMERCIAL

## 2024-03-04 DIAGNOSIS — Z12.31 ENCOUNTER FOR SCREENING MAMMOGRAM FOR BREAST CANCER: Primary | ICD-10-CM

## 2024-05-15 ENCOUNTER — OFFICE VISIT (OUTPATIENT)
Dept: OBSTETRICS AND GYNECOLOGY | Age: 54
End: 2024-05-15
Payer: COMMERCIAL

## 2024-05-15 VITALS
SYSTOLIC BLOOD PRESSURE: 118 MMHG | HEIGHT: 60 IN | WEIGHT: 128 LBS | DIASTOLIC BLOOD PRESSURE: 66 MMHG | BODY MASS INDEX: 25.13 KG/M2

## 2024-05-15 DIAGNOSIS — R87.810 ASCUS WITH POSITIVE HIGH RISK HPV CERVICAL: Primary | ICD-10-CM

## 2024-05-15 DIAGNOSIS — R87.610 ASCUS WITH POSITIVE HIGH RISK HPV CERVICAL: Primary | ICD-10-CM

## 2024-05-15 NOTE — PROGRESS NOTES
Procedures    [facility]   Obstetrics and Gynecology     5/15/2024    Patient: Theresa Capone          MR#:[id number]        Colposcopy Procedure Note    Chief Complaint   Patient presents with    Gynecologic Exam     CC: Colpo, last pap 11/8/23  neg, HPV +        Indications: Pap smear 6 months ago showed: ASCUS with POSITIVE high risk HPV.     Procedure Details   The risks and benefits of the procedure and Written informed consent obtained.    UCG: Not done    Speculum placed in vagina and excellent visualization of cervix achieved, cervix swabbed x 3 with acetic acid solution.    Findings:  Cervix: no visible lesions; SCJ visualized 360 degrees without lesions.  Vaginal inspection: vaginal colposcopy not performed.  Vulvar colposcopy: vulvar colposcopy not performed.    Physical Exam    Specimens: PAP    Impression:  Normal exam, no lesions      Complications: none.    The procedure was well tolerated by the patient without problems.    Plan:  Will base further treatment on Pathology findings.          Martinez Hidalgo MD  5/15/2024  08:29 EDT

## 2024-05-18 LAB
CYTOLOGIST CVX/VAG CYTO: NORMAL
CYTOLOGY CVX/VAG DOC CYTO: NORMAL
CYTOLOGY CVX/VAG DOC THIN PREP: NORMAL
DX ICD CODE: NORMAL
HPV I/H RISK 4 DNA CVX QL PROBE+SIG AMP: NEGATIVE
Lab: NORMAL
OTHER STN SPEC: NORMAL
STAT OF ADQ CVX/VAG CYTO-IMP: NORMAL

## 2024-07-01 ENCOUNTER — HOSPITAL ENCOUNTER (OUTPATIENT)
Dept: MAMMOGRAPHY | Facility: HOSPITAL | Age: 54
Discharge: HOME | End: 2024-07-01
Admitting: OBSTETRICS & GYNECOLOGY
Payer: COMMERCIAL

## 2024-07-01 DIAGNOSIS — Z12.31 ENCOUNTER FOR SCREENING MAMMOGRAM FOR BREAST CANCER: ICD-10-CM

## 2024-07-01 PROCEDURE — 77067 SCR MAMMO BI INCL CAD: CPT

## 2024-07-01 PROCEDURE — 77063 BREAST TOMOSYNTHESIS BI: CPT

## 2024-11-20 ENCOUNTER — OFFICE VISIT (OUTPATIENT)
Dept: OBSTETRICS AND GYNECOLOGY | Age: 54
End: 2024-11-20
Payer: COMMERCIAL

## 2024-11-20 VITALS
DIASTOLIC BLOOD PRESSURE: 76 MMHG | SYSTOLIC BLOOD PRESSURE: 118 MMHG | WEIGHT: 132 LBS | BODY MASS INDEX: 25.91 KG/M2 | HEIGHT: 60 IN

## 2024-11-20 DIAGNOSIS — Z13.89 SCREENING FOR BLOOD OR PROTEIN IN URINE: ICD-10-CM

## 2024-11-20 DIAGNOSIS — Z12.31 SCREENING MAMMOGRAM FOR BREAST CANCER: ICD-10-CM

## 2024-11-20 DIAGNOSIS — Z01.419 WELL FEMALE EXAM WITH ROUTINE GYNECOLOGICAL EXAM: Primary | ICD-10-CM

## 2024-11-20 DIAGNOSIS — Z11.51 SCREENING FOR HUMAN PAPILLOMAVIRUS (HPV): ICD-10-CM

## 2024-11-20 DIAGNOSIS — Z12.4 SCREENING FOR MALIGNANT NEOPLASM OF CERVIX: ICD-10-CM

## 2024-11-20 RX ORDER — ERGOCALCIFEROL 1.25 MG/1
50000 CAPSULE, LIQUID FILLED ORAL
COMMUNITY

## 2024-11-20 NOTE — PROGRESS NOTES
[facility]   Obstetrics and Gynecology     2024    Patient: Theresa Capone          MR#:[id number]    History of Present Illness    Chief Complaint   Patient presents with    Gynecologic Exam     CC:Annual, lat pap 5/15/24 neg, HPV neg, mammo 3/4/24 normal       54 y.o. female  who presents for annual exam.    The patient presents for her regular check feeling well without complaints    Relevant data reviewed:    No LMP recorded (lmp unknown). Patient has had an ablation.  Obstetric History:  OB History          0    Para   0    Term   0       0    AB   0    Living   0         SAB   0    IAB   0    Ectopic   0    Molar        Multiple   0    Live Births              Obstetric Comments   TWO ADOPTED - CASANDRA (15) AND CABRERA (11)  TWO GIRLS               Menstrual History:     No LMP recorded (lmp unknown). Patient has had an ablation.       Social History     Substance and Sexual Activity   Sexual Activity Yes    Partners: Male    Birth control/protection: Surgical    Comment: Partner infertility     ______________________________________  Patient Active Problem List   Diagnosis    Crohn's disease    Chronic anemia    Essential hypertension    Immunosuppression    Vitamin B12 deficiency anemia    Cystic fibroadenosis of right breast     Past Medical History:   Diagnosis Date    ASCUS with positive high risk HPV cervical     Crohn's disease     Crohn's disease     Crohn's ileitis     Fibrocystic breast     Fistula of small intestine     Fluid retention     TAKES DIURETIC    H/O intestinal malabsorption     D/T CROHNS    Hair loss     History of blood transfusion     MULTIPLE    Hypertension     HX, STATES OFF MED FOR 3 YEARS    Infertility, female     Iron deficiency anemia     FOLLOWED BY DR LISSY WANG, GETS IRON TRANSFUSIONS    LGSIL (low grade squamous intraepithelial dysplasia)     LGSIL on Pap smear of cervix     Perianal fistula     MULTIPLE    Rectal  polyp     Vitamin B 12 deficiency 07/2016    Vitamin D deficiency      Past Surgical History:   Procedure Laterality Date    APPENDECTOMY N/A     BREAST BIOPSY Right 04/17/2015    BENIGN-DR.HEATH MAS    BREAST BIOPSY Right 12/14/2010    BENIGN- DR. BERNADETTE MUSE    COLON RESECTION  1991    COLONOSCOPY N/A 2010    WNL    COLONOSCOPY N/A 03/07/2014    CIRCUMFERENTIAL ULCERATION AT TI, FB STAPLE REMOVED AND AREA COAGULATED,     COLPOSCOPY W/ BIOPSY / CURETTAGE  06/12/2015    [facility]    D & C HYSTEROSCOPY ENDOMETRIAL ABLATION N/A 11/15/2012    THERMACHOICE / DR. MAS    ENDOSCOPY AND COLONOSCOPY N/A 03/17/2017    ACTIVE ULCERATION AT THE NEOTERMINAL ILEUM, HYPERTROPHIC ANAL PAPILLA,     ENDOSCOPY AND COLONOSCOPY N/A 10/2012    CLS WITH ILEOCOLONIC ANASTAMOSIS EROSION, EGD OK, NEGATIVE SB BX,     RECTAL MASS EXCISION N/A 7/17/2017    Procedure: RECTAL MASS EXCISION;  Surgeon: Agustina Melendrez MD;  Location: [facility];  Service:      Social History     Tobacco Use   Smoking Status Never    Passive exposure: Never   Smokeless Tobacco Never     Family History   Problem Relation Age of Onset    Hypertension Mother     Hypertension Father     Stroke Maternal Aunt     Hypertension Brother     Melanoma Cousin 27    Malig Hyperthermia Neg Hx     Breast cancer Neg Hx     Ovarian cancer Neg Hx     Uterine cancer Neg Hx     Colon cancer Neg Hx     Prostate cancer Neg Hx      Prior to Admission medications    Medication Sig Start Date End Date Taking? Authorizing Provider   hydroCHLOROthiazide (HYDRODIURIL) 25 MG tablet Take 1 tablet by mouth Daily. 5/6/21  Yes Regi La MD   vitamin D (ERGOCALCIFEROL) 1.25 MG (47567 UT) capsule capsule Take 1 capsule by mouth Every 7 (Seven) Days.   Yes Regi La MD     _______________________________________    Current contraception:  Infertility   History of abnormal Pap smear: yes - LGSIL  Family history of uterine or ovarian cancer:  "no  Family History of colon cancer/colon polyps: no  History of abnormal mammogram: no  History of abnormal lipids: no    The following portions of the patient's history were reviewed and updated as appropriate: allergies, current medications, past family history, past medical history, past social history, past surgical history, and problem list.    Review of Systems    Pertinent items are noted in HPI.       Objective   Physical Exam    /76   Ht 152.4 cm (60\")   Wt 59.9 kg (132 lb)   LMP  (LMP Unknown)   BMI 25.78 kg/m²    BP Readings from Last 3 Encounters:   24 118/76   05/15/24 118/66   23 122/82      Wt Readings from Last 3 Encounters:   24 59.9 kg (132 lb)   05/15/24 58.1 kg (128 lb)   23 59.9 kg (132 lb)        BMI: Estimated body mass index is 25.78 kg/m² as calculated from the following:    Height as of this encounter: 152.4 cm (60\").    Weight as of this encounter: 59.9 kg (132 lb).       General: alert, appears stated age, and cooperative   Heart: regular rate and rhythm, S1, S2 normal, no murmur, click, rub or gallop   Lungs: clear to auscultation bilaterally   Abdomen: soft, non-tender, without masses, no organomegaly   Breast: inspection negative, no nipple discharge or bleeding, no masses or nodularity palpable   External genitalia/Vulva: External genitalia including bartholin's glands, Urethra, [place]'s gland and urethra meatus are normal, Perineum, rectum and anus appear normal , and Bladder appears normal without significant prolapse    Vagina: normal mucosa, normal discharge   Cervix: no lesions   Uterus: normal size and non-tender   Adnexa: normal adnexa     As part of wellness and prevention, the following topics were discussed with the patient:  Encouraged self breast exam  Physical activity and regular exercised encouraged.       Problem List   Meds  History  Prep for Surg   Imagin}    Assessment:  Diagnoses and all orders for this visit:    1. Well " female exam with routine gynecological exam (Primary)  -     IGP, Apt HPV,rfx 16 / 18,45    2. Screening for blood or protein in urine  -     POC Urinalysis Dipstick    3. Screening for human papillomavirus (HPV)  -     IGP, Apt HPV,rfx 16 / 18,45    4. Screening for malignant neoplasm of cervix  -     IGP, Apt HPV,rfx 16 / 18,45    5. Screening mammogram for breast cancer  -     Mammo Screening Digital Tomosynthesis Bilateral With CAD; Future    Other orders  -     Cancel: NuSwab VG+ - Swab, Vagina      Plan:  Return in 1 year (on 11/20/2025) for Annual exam.    Martinez Hidalgo MD  11/20/2024 08:32 EST

## 2025-07-02 ENCOUNTER — HOSPITAL ENCOUNTER (OUTPATIENT)
Dept: MAMMOGRAPHY | Facility: HOSPITAL | Age: 55
Discharge: HOME | End: 2025-07-02
Admitting: OBSTETRICS & GYNECOLOGY
Payer: COMMERCIAL

## 2025-07-02 DIAGNOSIS — Z12.31 SCREENING MAMMOGRAM FOR BREAST CANCER: ICD-10-CM

## 2025-07-02 PROCEDURE — 77067 SCR MAMMO BI INCL CAD: CPT

## 2025-07-02 PROCEDURE — 77063 BREAST TOMOSYNTHESIS BI: CPT

## (undated) DEVICE — GLV SURG SENSICARE ALOE LF PF SZ7.5 GRN

## (undated) DEVICE — GOWN,PREVENTION PLUS,XLARGE,STERILE: Brand: MEDLINE

## (undated) DEVICE — GLV SURG SENSICARE GREEN W/ALOE PF LF 7 STRL

## (undated) DEVICE — IRRIGATOR BULB ASEPTO 60CC STRL

## (undated) DEVICE — LOU MINOR PROCEDURE: Brand: MEDLINE INDUSTRIES, INC.

## (undated) DEVICE — PREMIUM WET SKIN PREP TRAY: Brand: MEDLINE INDUSTRIES, INC.

## (undated) DEVICE — SPNG LAP 18X18IN LF STRL PK/5

## (undated) DEVICE — DRAPE,UTILITY,TAPE,15X26,STERILE: Brand: MEDLINE

## (undated) DEVICE — NDL HYPO ECLPS SFTY 22G 1 1/2IN

## (undated) DEVICE — GLV SURG BIOGEL LTX PF 7

## (undated) DEVICE — STERILE LATEX POWDER-FREE SURGICAL GLOVESWITH NITRILE COATING: Brand: PROTEXIS

## (undated) DEVICE — SUT SILK 2/0 FS BLK 18IN 685G

## (undated) DEVICE — PREP SOL POVIDONE/IODINE BT 4OZ

## (undated) DEVICE — SUT VIC 4/0 SH 27IN J415H

## (undated) DEVICE — SYR LUERLOK 20CC

## (undated) DEVICE — SPNG GZ WOVN 4X4IN 12PLY 10/BX STRL

## (undated) DEVICE — ENSEAL TRIO TEMPERATURE CONTOLLED TISSUE SEALING TECHNOLOGY DISPOSABLE TISSUE SEALING DEVICE TAPTRONIC TRIGGER ACTIVATED POWER 3MM CURVED JAW: Brand: ENSEAL